# Patient Record
Sex: FEMALE | Race: AMERICAN INDIAN OR ALASKA NATIVE | NOT HISPANIC OR LATINO | Employment: OTHER | URBAN - METROPOLITAN AREA
[De-identification: names, ages, dates, MRNs, and addresses within clinical notes are randomized per-mention and may not be internally consistent; named-entity substitution may affect disease eponyms.]

---

## 2018-03-22 ENCOUNTER — HISTORICAL (OUTPATIENT)
Dept: RADIOLOGY | Facility: HOSPITAL | Age: 53
End: 2018-03-22

## 2018-03-22 LAB — H PYLORI AB SER IA-ACNC: NEGATIVE

## 2018-04-13 ENCOUNTER — HISTORICAL (OUTPATIENT)
Dept: SURGERY | Facility: HOSPITAL | Age: 53
End: 2018-04-13

## 2018-05-25 ENCOUNTER — HISTORICAL (OUTPATIENT)
Dept: RADIOLOGY | Facility: HOSPITAL | Age: 53
End: 2018-05-25

## 2018-06-29 ENCOUNTER — HISTORICAL (OUTPATIENT)
Dept: ADMINISTRATIVE | Facility: HOSPITAL | Age: 53
End: 2018-06-29

## 2018-06-29 LAB
ABS NEUT (OLG): 3.43 X10(3)/MCL (ref 2.1–9.2)
ALBUMIN SERPL-MCNC: 4 GM/DL (ref 3.4–5)
ALBUMIN/GLOB SERPL: 1 {RATIO}
ALP SERPL-CCNC: 107 UNIT/L (ref 45–117)
ALT SERPL-CCNC: 67 UNIT/L (ref 13–56)
AST SERPL-CCNC: 23 UNIT/L (ref 15–37)
BASOPHILS # BLD AUTO: 0.03 X10(3)/MCL (ref 0–0.2)
BASOPHILS NFR BLD AUTO: 0.5 % (ref 0–1)
BILIRUB SERPL-MCNC: 0.3 MG/DL (ref 0.2–1)
BILIRUBIN DIRECT+TOT PNL SERPL-MCNC: <0.1 MG/DL (ref 0–0.2)
BILIRUBIN DIRECT+TOT PNL SERPL-MCNC: >0.2 MG/DL (ref 0–1)
BUN SERPL-MCNC: 10 MG/DL (ref 7–18)
CALCIUM SERPL-MCNC: 9.3 MG/DL (ref 8.5–10.1)
CHLORIDE SERPL-SCNC: 104 MMOL/L (ref 98–107)
CO2 SERPL-SCNC: 26 MMOL/L (ref 21–32)
CREAT SERPL-MCNC: 0.76 MG/DL (ref 0.55–1.02)
CRP SERPL HS-MCNC: 7.64 MG/L
EOSINOPHIL # BLD AUTO: 0.2 X10(3)/MCL (ref 0–0.9)
EOSINOPHIL NFR BLD AUTO: 3.6 % (ref 0–6.4)
ERYTHROCYTE [DISTWIDTH] IN BLOOD BY AUTOMATED COUNT: 13 % (ref 11.5–17)
GLOBULIN SER-MCNC: 4 GM/DL (ref 2–4)
GLUCOSE SERPL-MCNC: 130 MG/DL (ref 74–106)
HCT VFR BLD AUTO: 40.4 % (ref 37–47)
HGB BLD-MCNC: 13.4 GM/DL (ref 12–16)
IMM GRANULOCYTES # BLD AUTO: 0.05 10*3/UL (ref 0–0.02)
IMM GRANULOCYTES NFR BLD AUTO: 0.9 % (ref 0–0.43)
LIPASE SERPL-CCNC: 158 UNIT/L (ref 73–393)
LYMPHOCYTES # BLD AUTO: 1.63 X10(3)/MCL (ref 0.6–4.6)
LYMPHOCYTES NFR BLD AUTO: 29.1 % (ref 16–44)
MCH RBC QN AUTO: 29.9 PG (ref 27–31)
MCHC RBC AUTO-ENTMCNC: 33.2 GM/DL (ref 33–36)
MCV RBC AUTO: 90.2 FL (ref 80–94)
MONOCYTES # BLD AUTO: 0.26 X10(3)/MCL (ref 0.1–1.3)
MONOCYTES NFR BLD AUTO: 4.6 % (ref 4–12.1)
NEUTROPHILS # BLD AUTO: 3.43 X10(3)/MCL (ref 2.1–9.2)
NEUTROPHILS NFR BLD AUTO: 61.3 % (ref 43–73)
NRBC BLD AUTO-RTO: 0 % (ref 0–0.2)
PLATELET # BLD AUTO: 357 X10(3)/MCL (ref 130–400)
PMV BLD AUTO: 9.3 FL (ref 7.4–10.4)
POC CREATININE: 0.7 MG/DL (ref 0.6–1.3)
POTASSIUM SERPL-SCNC: 4.2 MMOL/L (ref 3.5–5.1)
PROT SERPL-MCNC: 8.3 GM/DL (ref 6.4–8.2)
RBC # BLD AUTO: 4.48 X10(6)/MCL (ref 4.2–5.4)
SODIUM SERPL-SCNC: 139 MMOL/L (ref 136–145)
TSH SERPL-ACNC: 1.03 MIU/ML (ref 0.36–3.74)
WBC # SPEC AUTO: 5.6 X10(3)/MCL (ref 4.5–11.5)

## 2018-07-02 ENCOUNTER — HISTORICAL (OUTPATIENT)
Dept: LAB | Facility: HOSPITAL | Age: 53
End: 2018-07-02

## 2018-07-02 LAB — HEMOCCULT SP1 STL QL: NEGATIVE

## 2019-05-23 ENCOUNTER — HISTORICAL (OUTPATIENT)
Dept: ANESTHESIOLOGY | Facility: HOSPITAL | Age: 54
End: 2019-05-23

## 2019-05-28 ENCOUNTER — HISTORICAL (OUTPATIENT)
Dept: ANESTHESIOLOGY | Facility: HOSPITAL | Age: 54
End: 2019-05-28

## 2019-07-29 ENCOUNTER — HISTORICAL (OUTPATIENT)
Dept: PREADMISSION TESTING | Facility: HOSPITAL | Age: 54
End: 2019-07-29

## 2019-07-29 LAB
ABS NEUT (OLG): 4.36 X10(3)/MCL (ref 2.1–9.2)
ALBUMIN SERPL-MCNC: 4.4 GM/DL (ref 3.4–5)
ALBUMIN/GLOB SERPL: 1.3 RATIO (ref 1.1–2)
ALP SERPL-CCNC: 89 UNIT/L (ref 38–126)
ALT SERPL-CCNC: 71 UNIT/L (ref 12–78)
AST SERPL-CCNC: 28 UNIT/L (ref 15–37)
BASOPHILS # BLD AUTO: 0 X10(3)/MCL (ref 0–0.2)
BASOPHILS NFR BLD AUTO: 1 %
BILIRUB SERPL-MCNC: 0.5 MG/DL (ref 0.2–1)
BILIRUBIN DIRECT+TOT PNL SERPL-MCNC: 0.1 MG/DL (ref 0–0.5)
BILIRUBIN DIRECT+TOT PNL SERPL-MCNC: 0.4 MG/DL (ref 0–0.8)
BUN SERPL-MCNC: 7 MG/DL (ref 7–18)
CALCIUM SERPL-MCNC: 10 MG/DL (ref 8.5–10.1)
CHLORIDE SERPL-SCNC: 103 MMOL/L (ref 98–107)
CO2 SERPL-SCNC: 27 MMOL/L (ref 21–32)
CREAT SERPL-MCNC: 0.73 MG/DL (ref 0.55–1.02)
EOSINOPHIL # BLD AUTO: 0.4 X10(3)/MCL (ref 0–0.9)
EOSINOPHIL NFR BLD AUTO: 6 %
ERYTHROCYTE [DISTWIDTH] IN BLOOD BY AUTOMATED COUNT: 12.7 % (ref 11.5–17)
GLOBULIN SER-MCNC: 3.4 GM/DL (ref 2.4–3.5)
GLUCOSE SERPL-MCNC: 108 MG/DL (ref 74–106)
HCT VFR BLD AUTO: 43.7 % (ref 37–47)
HGB BLD-MCNC: 14.2 GM/DL (ref 12–16)
LYMPHOCYTES # BLD AUTO: 1.5 X10(3)/MCL (ref 0.6–4.6)
LYMPHOCYTES NFR BLD AUTO: 22 %
MCH RBC QN AUTO: 29.2 PG (ref 27–31)
MCHC RBC AUTO-ENTMCNC: 32.5 GM/DL (ref 33–36)
MCV RBC AUTO: 89.9 FL (ref 80–94)
MONOCYTES # BLD AUTO: 0.4 X10(3)/MCL (ref 0.1–1.3)
MONOCYTES NFR BLD AUTO: 6 %
NEUTROPHILS # BLD AUTO: 4.36 X10(3)/MCL (ref 2.1–9.2)
NEUTROPHILS NFR BLD AUTO: 64 %
PLATELET # BLD AUTO: 358 X10(3)/MCL (ref 130–400)
PMV BLD AUTO: 9.5 FL (ref 9.4–12.4)
POTASSIUM SERPL-SCNC: 4.4 MMOL/L (ref 3.5–5.1)
PROT SERPL-MCNC: 7.8 GM/DL (ref 6.4–8.2)
RBC # BLD AUTO: 4.86 X10(6)/MCL (ref 4.2–5.4)
SODIUM SERPL-SCNC: 139 MMOL/L (ref 136–145)
WBC # SPEC AUTO: 6.8 X10(3)/MCL (ref 4.5–11.5)

## 2019-07-31 ENCOUNTER — HISTORICAL (OUTPATIENT)
Dept: SURGERY | Facility: HOSPITAL | Age: 54
End: 2019-07-31

## 2021-10-22 ENCOUNTER — HISTORICAL (OUTPATIENT)
Dept: ADMINISTRATIVE | Facility: HOSPITAL | Age: 56
End: 2021-10-22

## 2021-11-05 ENCOUNTER — HISTORICAL (OUTPATIENT)
Dept: BARIATRICS | Facility: HOSPITAL | Age: 56
End: 2021-11-05

## 2021-11-09 ENCOUNTER — HISTORICAL (OUTPATIENT)
Dept: RADIOLOGY | Facility: HOSPITAL | Age: 56
End: 2021-11-09

## 2021-11-12 ENCOUNTER — HISTORICAL (OUTPATIENT)
Dept: ADMINISTRATIVE | Facility: HOSPITAL | Age: 56
End: 2021-11-12

## 2021-12-02 ENCOUNTER — HISTORICAL (OUTPATIENT)
Dept: BARIATRICS | Facility: HOSPITAL | Age: 56
End: 2021-12-02

## 2022-01-06 ENCOUNTER — HISTORICAL (OUTPATIENT)
Dept: BARIATRICS | Facility: HOSPITAL | Age: 57
End: 2022-01-06

## 2022-02-03 ENCOUNTER — HISTORICAL (OUTPATIENT)
Dept: BARIATRICS | Facility: HOSPITAL | Age: 57
End: 2022-02-03

## 2022-04-11 ENCOUNTER — HISTORICAL (OUTPATIENT)
Dept: ADMINISTRATIVE | Facility: HOSPITAL | Age: 57
End: 2022-04-11
Payer: MEDICARE

## 2022-04-26 ENCOUNTER — HISTORICAL (OUTPATIENT)
Dept: BARIATRICS | Facility: HOSPITAL | Age: 57
End: 2022-04-26
Payer: MEDICARE

## 2022-04-29 VITALS
OXYGEN SATURATION: 97 % | DIASTOLIC BLOOD PRESSURE: 92 MMHG | WEIGHT: 222.88 LBS | HEIGHT: 67 IN | BODY MASS INDEX: 34.98 KG/M2 | SYSTOLIC BLOOD PRESSURE: 139 MMHG

## 2022-04-30 NOTE — OP NOTE
Patient:   Teagan Aldrich            MRN: [id number]            FIN: [id number]               Age:   54 years     Sex:  Female     :  1965   Associated Diagnoses:   Cholecystitis, chronic   Author:   Malcom Hathaway MD      Operative Note   Operative Information   Procedures Performed: Procedure Code   Cholecystectomy Laparoscopic (None) on 2019 at 54 Years.  Comments:  2019 12:43 ROSANNET - Jovany STEWARD, Carmela BOSCH  auto-populated from documented surgical case.     Indications: chronic cholecystitis.     Preoperative Diagnosis: Cholecystitis, chronic (RHL51-SW K81.1).     Postoperative Diagnosis: Cholecystitis, chronic (GOD10-VJ K81.1).     Surgeon: Malcom Hathaway MD.     Assistant: Delaney Santiago.     Anesthesia: Gen..     Speciman Removed: gallbladder.     Description of Procedure/Findings/    Complications:     The patient was taken to the operating room. Patient was placed under general anesthesia. Abdomen was prepped and draped in the usual sterile fashion. An appropriate timeout was performed. Optical tipped trocar was used to access the peritoneal cavity. Pneumoperitoneum was instituted. Abdominal exploration was negative. Gallbladder was noted and held in a cephalad direction. The infundibulum was noted and held in a lateral direction. The peritoneum overlying the infundibulum was dissected revealing the cystic duct gallbladder junction and the cystic artery. Critical view was obtained. The cystic duct and cystic artery were clipped and transected. The gallbladder was removed from the undersurface of the liver with sharp and electric dissection. Hemostasis was assured. The clips were in excellent position and there was no bleeding or leakage of bile. Gallbladder was completely removed from the liver hemostasis was assured. The gallbladder was removed from the abdomen. Once again hemostasis was assured the operative site was irrigated until clear. Trochars were removed and  pneumoperitoneum released the incisions were closed with Vicryl..     Esimated blood loss: loss less than  15  cc.     Complications: None.

## 2022-04-30 NOTE — H&P
Patient:   Teagan Aldrich            MRN: [id number]            FIN: [id number]               Age:   56 years     Sex:  Female     :  1965   Associated Diagnoses:   Chronic GERD   Author:   Malcom Hathaway MD      Basic Information     Presents for EGD      Health Status   Allergies:    Allergic Reactions (Selected)  Severity Not Documented  Bactrim- Hives.  Sulfamethoxazole- Hives.   Current medications:    No qualifying data available        Histories   Past Medical History:    Active  Anxiety ([id number])  Depression (730581596)  Hypothyroidism (42153387)  Resolved  Well adult (682682731):  Resolved.  Chest pain (36330134):  Resolved.  Comments:  10/3/2016 CDT 15:11 CDT - Carolina Hurt RN  throught the day, has to relax for it to go away, 5/10    10/3/2016 CDT 15:25 ROSANNET Carolina Delatorre RN  upper gastric area like before her last surgery  Migraine (74226008):  Resolved.   Social History        Social & Psychosocial Habits    Alcohol  2015 Risk Assessment: Denies Alcohol Use    2020  Use: Current    Frequency: 1-2 times per year    Employment/School  2015  Status: Unemployed    Highest education: High school    2016  Status: DISABLED, Unemployed    Highest education: High school    Exercise  2015 Risk Assessment: Does not exercise    2016  Duration (average number of minutes): 60    Times per week: 3-4 times/week    Self assessment: Fair condition    Exercise type: Swimming, Walking    Home/Environment  2015  Lives with: Spouse    Living situation: Home/Independent    Alcohol abuse in household: No    Substance abuse in household: No    Smoker in household: No    Injuries/Abuse/Neglect in household: No    Feels unsafe at home: No    Safe place to go: Yes    Agency(s)/Others notified: No    Family/Friends available to help: Yes    Concern for family members at home: No    Major illness in household: No    Financial concerns: No    Concerns over  TV/Computer/Game use: No    Other risks in environment: Pets/Animal exposure, Unlocked guns    01/21/2016  Lives with: Spouse    Living situation: Home/Independent    Home equipment: BLOOD PRESSURE MONITOR    Alcohol abuse in household: No    Substance abuse in household: No    Smoker in household: No    Feels unsafe at home: No    Safe place to go: Yes    Family/Friends available to help: Yes    Nutrition/Health  08/20/2015  Type of diet: Regular    Wants to lose weight: Yes    Sleeping concerns: Yes    Feels highly stressed: Yes    01/21/2016  Diet description: ALL ORGANIC NO GLUTEN FREE    Substance Use  08/20/2015 Risk Assessment: Denies Substance Abuse    10/24/2015  Use: Never    Tobacco  07/24/2015 Risk Assessment: Denies Tobacco Use    06/05/2020  Use: Former smoker, quit more    Patient Wants Consult For Cessation Counseling N/A    Abuse/Neglect  06/05/2020  SHX Any signs of abuse or neglect No  .        Physical Examination   General:  Alert and oriented.    HENT:  Normocephalic.    Neck:  Supple.    Respiratory:  Lungs are clear to auscultation.    Cardiovascular:  Normal rate.    Gastrointestinal:  Normal, Abdomen.    Genitourinary:  No costovertebral angle tenderness.    Musculoskeletal:  Normal range of motion.    Integumentary:  Warm.    Neurologic:  Alert.       Review / Management   Results review:     No qualifying data available.       Impression and Plan   Diagnosis     Chronic GERD (GBX40-RM K21.9).         EGD

## 2022-04-30 NOTE — OP NOTE
Patient:   Teagan Aldrich            MRN: [id number]            FIN: [id number]               Age:   52 years     Sex:  Female     :  1965   Associated Diagnoses:   HEARTBURN   Author:   Malcom Hathaway MD      Operative Note   Operative Information   Date/ Time:  2018 11:07:00.     Procedures Performed: Procedure Code   Esophagogastroduodenoscopy on 2018 at 52 Years.  Comments:  2018 10:12 - Mitchell STEWARD, Anahi  auto-populated from documented surgical case, Esophagogastroduodenoscopy.     Preoperative Diagnosis: HEARTBURN (JCG42-ZF R12).     Postoperative Diagnosis: HEARTBURN (XWX61-FF R12).     Surgeon: Malcom Hathaway MD.     Anesthesia: MAC.     Description of Procedure/Findings/    Complications: Patient was taken to the OR.  The patient's throat was aneshtetized.  MAC was utilized for anesthesia.   was easily passed.  Findings were as follows:  -Oropharynx: normal  -Airway: normal  -Esophagus: normal  -Stomach: normal, No HH recurrence, Nissen intact  -Duodenum: normal.     Esimated blood loss: No blood loss.     Complications: None.

## 2022-09-21 ENCOUNTER — CLINICAL SUPPORT (OUTPATIENT)
Dept: BARIATRICS | Facility: HOSPITAL | Age: 57
End: 2022-09-21

## 2022-09-21 VITALS — BODY MASS INDEX: 35.21 KG/M2 | WEIGHT: 221.69 LBS

## 2022-10-10 NOTE — OP NOTE
Patient:   Teagan Aldrich            MRN: [id number]            FIN: [id number]               Age:   56 years     Sex:  Female     :  1965   Associated Diagnoses:   HEARTBURN   Author:   Malcom Hathaway MD      Operative Note   Operative Information   Procedures Performed: Procedure Code   21546- EGD FLEX TRANSORAL DX W/BRUSHING/WASHING (None) on 2021 at 56 Years.  Comments:  2021 12:38 CST - Jovany STEWARD, Brianne CHAPMAN  auto-populated from documented surgical case, Esophagogastroduodenoscopy.     Preoperative Diagnosis: HEARTBURN (DVC50-PI R12).     Postoperative Diagnosis: HEARTBURN (YGM18-QK R12).     Surgeon: Malcom Hathaway MD.     Anesthesia: MAC.     Description of Procedure/Findings/    Complications: Patient was taken to the OR.  The patient's throat was aneshtetized.  MAC was utilized for anesthesia.   was easily passed.  Findings were as follows:  -Oropharynx: normal  -Airway: normal  -Esophagus: normal  -Stomach: normal mucosa , Niissen fundoplication intact, no evidenc of recurrent HH  -Duodenum: normal.     Esimated blood loss: No blood loss.     Complications: None.      None

## 2022-12-09 ENCOUNTER — HOSPITAL ENCOUNTER (EMERGENCY)
Facility: HOSPITAL | Age: 57
Discharge: HOME | End: 2022-12-09
Attending: INTERNAL MEDICINE
Payer: MEDICARE

## 2022-12-09 VITALS
SYSTOLIC BLOOD PRESSURE: 107 MMHG | TEMPERATURE: 98 F | OXYGEN SATURATION: 100 % | HEART RATE: 80 BPM | DIASTOLIC BLOOD PRESSURE: 65 MMHG | WEIGHT: 220 LBS | RESPIRATION RATE: 17 BRPM | BODY MASS INDEX: 34.94 KG/M2

## 2022-12-09 DIAGNOSIS — G43.711 INTRACTABLE CHRONIC MIGRAINE WITHOUT AURA AND WITH STATUS MIGRAINOSUS: Primary | ICD-10-CM

## 2022-12-09 PROCEDURE — 99284 EMERGENCY DEPT VISIT MOD MDM: CPT | Mod: 25

## 2022-12-09 PROCEDURE — 63600175 PHARM REV CODE 636 W HCPCS: Performed by: PHYSICIAN ASSISTANT

## 2022-12-09 PROCEDURE — 96372 THER/PROPH/DIAG INJ SC/IM: CPT | Performed by: PHYSICIAN ASSISTANT

## 2022-12-09 RX ORDER — SERTRALINE HYDROCHLORIDE 100 MG/1
150 TABLET, FILM COATED ORAL
COMMUNITY
Start: 2022-11-27

## 2022-12-09 RX ORDER — KETOROLAC TROMETHAMINE 30 MG/ML
30 INJECTION, SOLUTION INTRAMUSCULAR; INTRAVENOUS
Status: COMPLETED | OUTPATIENT
Start: 2022-12-09 | End: 2022-12-09

## 2022-12-09 RX ORDER — GLIPIZIDE 5 MG/1
2.5 TABLET ORAL 2 TIMES DAILY
COMMUNITY
Start: 2022-11-19

## 2022-12-09 RX ORDER — LEVOTHYROXINE SODIUM 50 UG/1
50 TABLET ORAL
COMMUNITY
Start: 2022-11-22

## 2022-12-09 RX ORDER — ISOSORBIDE DINITRATE 5 MG/1
5 TABLET ORAL 2 TIMES DAILY
COMMUNITY
Start: 2022-11-21

## 2022-12-09 RX ORDER — DIPHENHYDRAMINE HYDROCHLORIDE 50 MG/ML
12.5 INJECTION INTRAMUSCULAR; INTRAVENOUS
Status: DISCONTINUED | OUTPATIENT
Start: 2022-12-09 | End: 2022-12-09

## 2022-12-09 RX ORDER — DILTIAZEM HYDROCHLORIDE 120 MG/1
120 CAPSULE, COATED, EXTENDED RELEASE ORAL 3 TIMES DAILY
COMMUNITY
Start: 2022-11-04

## 2022-12-09 RX ORDER — DIPHENHYDRAMINE HYDROCHLORIDE 50 MG/ML
12.5 INJECTION INTRAMUSCULAR; INTRAVENOUS
Status: COMPLETED | OUTPATIENT
Start: 2022-12-09 | End: 2022-12-09

## 2022-12-09 RX ORDER — MELOXICAM 15 MG/1
15 TABLET ORAL
COMMUNITY
Start: 2022-11-02 | End: 2024-03-27

## 2022-12-09 RX ORDER — ALPRAZOLAM 0.5 MG/1
0.5 TABLET ORAL 2 TIMES DAILY PRN
COMMUNITY
Start: 2022-11-01

## 2022-12-09 RX ORDER — LANOLIN ALCOHOL/MO/W.PET/CERES
1 CREAM (GRAM) TOPICAL 2 TIMES DAILY
COMMUNITY
Start: 2022-11-03 | End: 2024-03-27

## 2022-12-09 RX ORDER — ASPIRIN 81 MG/1
81 TABLET ORAL
COMMUNITY

## 2022-12-09 RX ORDER — TOPIRAMATE 25 MG/1
25 TABLET ORAL 2 TIMES DAILY
COMMUNITY
Start: 2022-11-22 | End: 2024-03-28

## 2022-12-09 RX ORDER — ATORVASTATIN CALCIUM 20 MG/1
20 TABLET, FILM COATED ORAL
COMMUNITY
Start: 2022-09-24

## 2022-12-09 RX ORDER — BUPROPION HYDROCHLORIDE 100 MG/1
TABLET ORAL
COMMUNITY
End: 2024-03-28

## 2022-12-09 RX ORDER — METOCLOPRAMIDE HYDROCHLORIDE 5 MG/ML
10 INJECTION INTRAMUSCULAR; INTRAVENOUS
Status: COMPLETED | OUTPATIENT
Start: 2022-12-09 | End: 2022-12-09

## 2022-12-09 RX ORDER — METFORMIN HYDROCHLORIDE 1000 MG/1
1000 TABLET ORAL 2 TIMES DAILY
COMMUNITY
Start: 2022-10-07

## 2022-12-09 RX ORDER — SOTALOL HYDROCHLORIDE 160 MG/1
160 TABLET ORAL 2 TIMES DAILY
COMMUNITY
Start: 2022-10-10

## 2022-12-09 RX ORDER — BUTALBITAL, ACETAMINOPHEN AND CAFFEINE 50; 325; 40 MG/1; MG/1; MG/1
1 TABLET ORAL EVERY 6 HOURS PRN
Qty: 20 TABLET | Refills: 0 | Status: SHIPPED | OUTPATIENT
Start: 2022-12-09 | End: 2024-03-28

## 2022-12-09 RX ORDER — AMLODIPINE BESYLATE 2.5 MG/1
TABLET ORAL
COMMUNITY
End: 2024-03-28

## 2022-12-09 RX ADMIN — KETOROLAC TROMETHAMINE 30 MG: 30 INJECTION, SOLUTION INTRAMUSCULAR; INTRAVENOUS at 01:12

## 2022-12-09 RX ADMIN — DIPHENHYDRAMINE HYDROCHLORIDE 12.5 MG: 50 INJECTION INTRAMUSCULAR; INTRAVENOUS at 01:12

## 2022-12-09 RX ADMIN — METOCLOPRAMIDE 10 MG: 5 INJECTION, SOLUTION INTRAMUSCULAR; INTRAVENOUS at 01:12

## 2022-12-09 NOTE — ED PROVIDER NOTES
Encounter Date: 12/9/2022       History     Chief Complaint   Patient presents with    Headache     X3 weeks, hx of migraine, +photosensitivity, placed on tramadol 2 weeks ago with no relief     Teagan Aldrich is a 57 y.o. female with a history of chronic migraines who presents to the ED with an intractable migraine. She reports this migraine has been present for 3 weeks and she could no longer take it  so presented here today. She saw her neurologist on 11/22 and was started on topomax increase weekly. Patient reports she has been compliant with topomax, but has had no relief. She reports photosensitivity. She has some relief with cold compresses. Endorses nausea this am that has now resolved.    The history is provided by the patient. No  was used.   Review of patient's allergies indicates:   Allergen Reactions    Bactrim [sulfamethoxazole-trimethoprim]      No past medical history on file.  No past surgical history on file.  No family history on file.     Review of Systems   Constitutional:  Negative for fever.   HENT:  Negative for congestion and sore throat.    Eyes:  Positive for photophobia. Negative for visual disturbance.   Respiratory:  Negative for cough and shortness of breath.    Cardiovascular:  Negative for chest pain and leg swelling.   Gastrointestinal:  Negative for abdominal distention, abdominal pain and nausea.   Genitourinary:  Negative for dysuria and frequency.   Musculoskeletal:  Negative for arthralgias and back pain.   Skin:  Negative for color change and rash.   Neurological:  Positive for headaches. Negative for weakness.   Hematological:  Does not bruise/bleed easily.   Psychiatric/Behavioral:  Negative for agitation and confusion.      Physical Exam     Initial Vitals [12/09/22 1243]   BP Pulse Resp Temp SpO2   104/68 80 18 97.4 °F (36.3 °C) 100 %      MAP       --         Physical Exam    Nursing note and vitals reviewed.  Constitutional: She appears  well-developed and well-nourished. No distress.   HENT:   Head: Normocephalic and atraumatic.   Mouth/Throat: No oropharyngeal exudate.   Eyes: EOM are normal. No scleral icterus.   Neck: Neck supple. No thyromegaly present.   Normal range of motion.  Cardiovascular:  Normal rate and regular rhythm.           No murmur heard.  Pulmonary/Chest: Breath sounds normal. No respiratory distress. She has no wheezes.   Abdominal: Abdomen is soft. Bowel sounds are normal. She exhibits no distension. There is no abdominal tenderness.   Musculoskeletal:         General: No edema. Normal range of motion.      Cervical back: Normal range of motion and neck supple.     Neurological: She is alert and oriented to person, place, and time. No cranial nerve deficit.   Skin: Skin is warm and dry. Capillary refill takes less than 2 seconds. No erythema.   Psychiatric: She has a normal mood and affect. Thought content normal.       ED Course   Procedures  Labs Reviewed - No data to display       Imaging Results    None          Medications   ketorolac injection 30 mg (30 mg Intramuscular Given 12/9/22 1357)   metoclopramide HCl injection 10 mg (10 mg Intramuscular Given 12/9/22 1357)   diphenhydrAMINE injection 12.5 mg (12.5 mg Intramuscular Given 12/9/22 1357)                 ED Course as of 12/09/22 1501   Fri Dec 09, 2022   1451 Patient reports improvement in pain to 2/10. Requesting discharge.  [KD]      ED Course User Index  [KD] Tana Quinones PA-C                 Clinical Impression:   Final diagnoses:  [G43.711] Intractable chronic migraine without aura and with status migrainosus (Primary)      ED Disposition Condition    Discharge Stable          ED Prescriptions       Medication Sig Dispense Start Date End Date Auth. Provider    butalbital-acetaminophen-caffeine -40 mg (FIORICET, ESGIC) -40 mg per tablet Take 1 tablet by mouth every 6 (six) hours as needed for Headaches. 20 tablet 12/9/2022 -- Tana BLAKE  JERZY Quinones          Follow-up Information       Follow up With Specialties Details Why Contact Info    Teagan Galdamez MD Family Medicine In 3 days Hospital follow up [address] [facility]  [address] [address]  [phone]      [facility] Emergency Medicine  If symptoms worsen [address] [facility] [id number] [phone]             Tana Quinones PA-C  12/09/22 1500

## 2022-12-15 ENCOUNTER — PATIENT MESSAGE (OUTPATIENT)
Dept: RESEARCH | Facility: HOSPITAL | Age: 57
End: 2022-12-15
Payer: MEDICARE

## 2023-02-27 DIAGNOSIS — K57.30 DIVERTICULOSIS OF LARGE INTESTINE WITHOUT DIVERTICULITIS: Primary | ICD-10-CM

## 2023-02-27 DIAGNOSIS — Z98.890 PERSONAL HISTORY OF SURGERY TO HEART AND GREAT VESSELS, PRESENTING HAZARDS TO HEALTH: Primary | ICD-10-CM

## 2023-03-02 ENCOUNTER — HOSPITAL ENCOUNTER (OUTPATIENT)
Dept: RADIOLOGY | Facility: HOSPITAL | Age: 58
Discharge: HOME | End: 2023-03-02
Payer: MEDICARE

## 2023-03-02 DIAGNOSIS — K57.30 DIVERTICULOSIS OF LARGE INTESTINE WITHOUT DIVERTICULITIS: ICD-10-CM

## 2023-03-02 PROCEDURE — A9698 NON-RAD CONTRAST MATERIALNOC: HCPCS

## 2023-03-02 PROCEDURE — 74240 X-RAY XM UPR GI TRC 1CNTRST: CPT | Mod: TC

## 2023-03-02 PROCEDURE — 25500020 PHARM REV CODE 255

## 2023-03-02 RX ADMIN — BARIUM SULFATE 100 ML: 980 POWDER, FOR SUSPENSION ORAL at 09:03

## 2023-03-02 RX ADMIN — BARIUM SULFATE 100 ML: 0.6 SUSPENSION ORAL at 09:03

## 2023-07-20 ENCOUNTER — CLINICAL SUPPORT (OUTPATIENT)
Dept: BARIATRICS | Facility: HOSPITAL | Age: 58
End: 2023-07-20
Payer: MEDICARE

## 2023-07-20 VITALS — WEIGHT: 239.5 LBS | HEIGHT: 67 IN | BODY MASS INDEX: 37.59 KG/M2

## 2023-07-20 PROCEDURE — 94200034 HC BARIATRIC NUTRITIONAL SVCS PT GRADE I: Performed by: DIETITIAN, REGISTERED

## 2023-07-20 NOTE — PROGRESS NOTES
Patient Education [x] MSWL Visit Number: 1/3     []  Pre-op Wt Loss Goal (as ordered on referral):   [] NSWL Visit:     Current Wt: 239#  Current BMI: 38 kg/m2                                                                      Barriers to learning:  [x]None evident  []Acuity of illness  []Cognitive defects  []Cultural barriers  []Desire/Motivation  []Difficulty concentrating  []Emotional state  []Financial concerns  []Hearing deficit  []Language barrier  []Literacy  []Memory problems  []Vision impairment     Home caregiver present for session   []YES  [x] NO       Teaching methods:  []Demonstration  [x]Explanation  []Printed materials  []Teach back  []Virtual/web based         Verbalizes understanding Demonstrates  Needs further teaching Needs practice/ supervision Comments    Bariatric Surgery Diet  [] [] [] []    Clear liquid [] [] [] []    Full liquid [] [] [] []    Weight Reduction [x] [] [] []    Other Diet [] [] [] []          Additional Learner (s) Present                                                                  []Spouse   []Daughter    []  Son  []Family member   []Friend   []Grandfather   []Grandmother   []Father   []Mother   []Other       Expected Compliance:  [x]Good  []Fair  []Poor    Additional Information:    Pt restarting MSWL visits for upcoming bariatric surgery. Last visit was a weight check in 9/2022. Pt having knee replacement next week.    24 hr recall:  B- skipped  L- sandwich with chips  S- small snack    Plan:  Resume 3 meals per day.  Incorporate a protein and either a fruit or vegetable at each meal.  Adequate water intake.

## 2023-08-22 ENCOUNTER — CLINICAL SUPPORT (OUTPATIENT)
Dept: BARIATRICS | Facility: HOSPITAL | Age: 58
End: 2023-08-22
Payer: MEDICARE

## 2023-08-22 VITALS — WEIGHT: 237 LBS | HEIGHT: 67 IN | BODY MASS INDEX: 37.2 KG/M2

## 2023-08-22 DIAGNOSIS — E66.9 OBESITY (BMI 30-39.9): Primary | ICD-10-CM

## 2023-08-22 PROCEDURE — 94200034 HC BARIATRIC NUTRITIONAL SVCS PT GRADE I: Performed by: DIETITIAN, REGISTERED

## 2023-08-22 NOTE — PROGRESS NOTES
Patient Education [x] MSWL Visit Number: 2/3     []  Pre-op Wt Loss Goal (as ordered on referral):   [] NS Visit:     Current Wt: 237#  Current BMI: 37.6 kg/m2                                                                      Barriers to learning:  [x]None evident  []Acuity of illness  []Cognitive defects  []Cultural barriers  []Desire/Motivation  []Difficulty concentrating  []Emotional state  []Financial concerns  []Hearing deficit  []Language barrier  []Literacy  []Memory problems  []Vision impairment     Home caregiver present for session   []YES  [x] NO       Teaching methods:  []Demonstration  [x]Explanation  []Printed materials  []Teach back  []Virtual/web based         Verbalizes understanding Demonstrates  Needs further teaching Needs practice/ supervision Comments    Bariatric Surgery Diet  [] [] [] []    Clear liquid [] [] [] []    Full liquid [] [] [] []    Weight Reduction [x] [] [] []    Other Diet [] [] [] []          Additional Learner (s) Present                                                                  []Spouse   []Daughter    []  Son  []Family member   []Friend   []Grandfather   []Grandmother   []Father   []Mother   []Other       Expected Compliance:  [x]Good  []Fair  []Poor    Additional Information:    Pt  with 2# wt loss since last visit. She had knee replacement surgery approx 4 weeks ago and is still dealing with some pain and is transitioning from home PT to clinic based PT soon. Pt reports less appetite due to the pain she is experiencing. Pt reports 2 main meals per day with snacks. Pt states she is drinking only calorie free beverages- water, Powerade Zero.    Plan:  3 meals per day with a protein and either a fruit or vegetable at each meal. Starch serving should be the smallest of 3 on plate.  Continue clinic based PT as ordered and increase movement.

## 2023-09-14 ENCOUNTER — TELEPHONE (OUTPATIENT)
Dept: SURGERY | Facility: CLINIC | Age: 58
End: 2023-09-14
Payer: MEDICARE

## 2023-09-15 NOTE — TELEPHONE ENCOUNTER
Isis Logan, ABDOULAYE Becker      Will defer med recommendations to MA/ NP.   No surgery dates available yet for revisions.  yet for revisions.

## 2023-09-15 NOTE — TELEPHONE ENCOUNTER
Delaney - no revisions available at this time. However pt is still questioning medications. Per Cassandra message -  Pt called stating she has been having GERD issues, she saw an ENT recently and was prescribed esomeprazole and famotidime and wants to make sure this is ok to take right now before having her procedure

## 2023-09-19 ENCOUNTER — CLINICAL SUPPORT (OUTPATIENT)
Dept: BARIATRICS | Facility: HOSPITAL | Age: 58
End: 2023-09-19
Payer: MEDICARE

## 2023-09-19 VITALS — WEIGHT: 234 LBS | BODY MASS INDEX: 36.73 KG/M2 | HEIGHT: 67 IN

## 2023-09-19 DIAGNOSIS — E66.9 OBESITY (BMI 30-39.9): Primary | ICD-10-CM

## 2023-09-19 PROCEDURE — 94200034 HC BARIATRIC NUTRITIONAL SVCS PT GRADE I: Performed by: DIETITIAN, REGISTERED

## 2023-10-10 ENCOUNTER — OFFICE VISIT (OUTPATIENT)
Dept: SURGERY | Facility: CLINIC | Age: 58
End: 2023-10-10
Payer: MEDICARE

## 2023-10-10 VITALS
BODY MASS INDEX: 35.66 KG/M2 | DIASTOLIC BLOOD PRESSURE: 84 MMHG | SYSTOLIC BLOOD PRESSURE: 128 MMHG | HEIGHT: 67 IN | WEIGHT: 227.19 LBS | HEART RATE: 103 BPM

## 2023-10-10 DIAGNOSIS — K21.9 GASTROESOPHAGEAL REFLUX DISEASE, UNSPECIFIED WHETHER ESOPHAGITIS PRESENT: Primary | ICD-10-CM

## 2023-10-10 PROCEDURE — 99214 OFFICE O/P EST MOD 30 MIN: CPT | Mod: ,,, | Performed by: SURGERY

## 2023-10-10 PROCEDURE — 99214 PR OFFICE/OUTPT VISIT, EST, LEVL IV, 30-39 MIN: ICD-10-PCS | Mod: ,,, | Performed by: SURGERY

## 2023-10-10 RX ORDER — FAMOTIDINE 20 MG/1
20 TABLET, FILM COATED ORAL 2 TIMES DAILY
COMMUNITY
End: 2024-03-28

## 2023-10-10 RX ORDER — MAGNESIUM 200 MG
400 TABLET ORAL
COMMUNITY

## 2023-10-10 NOTE — PROGRESS NOTES
"    [facility]  [address] [place] [address]  Phone: [phone]  Fax: [phone]    [facility]  FU GERD   Dr. Malcom Hathaway      Patient: Teagan Aldrich  Date: 10/10/2023   Author: Malcom Hathaway MD    Chief Complaint:   Chief Complaint   Patient presents with    Consult     Discuss revisional RNY     Weight Gain       History of Present Illness:  Ms. Teagan Aldrich is a 58 y.o. female who is several years s/p Lap Nissen  by Dr. Malcom Hathaway.    .     complaints.  dysphagia,  GERD, NV, no abd pain. Regular BMs.   Diet: compliant with bariatric meal plan, tolerating bariatric soft diet          Estimated body mass index is 35.58 kg/m² as calculated from the following:    Height as of this encounter: 5' 7" (1.702 m).    Weight as of this encounter: 103.1 kg (227 lb 3.2 oz).                   Patient Care Team:  Teagan Galdamez MD as PCP - General (Family Medicine)     Review of Systems:    12 point review of systems conducted, negative except as stated in the history of present illness. See HPI for details.    Review of patient's allergies indicates:   Allergen Reactions    Bactrim [sulfamethoxazole-trimethoprim]      Past Medical History:   Diagnosis Date    Abdominal pain     Anal polyp     Anxiety     Depression     Diabetes mellitus     Disorder of lumbar spine     Diverticul disease small and large intestine, no perforati or abscess     GERD (gastroesophageal reflux disease)     Heart murmur     Hyperlipidemia     Hypertension     Irregular heart rhythm     Migraine     Morbid obesity     Panic attack     SVT (supraventricular tachycardia)     Thyroid disease      Past Surgical History:   Procedure Laterality Date    BACK SURGERY      06/2022    CARPAL TUNNEL RELEASE      CHOLECYSTECTOMY      COLONOSCOPY      ESOPHAGOGASTRODUODENOSCOPY      hemorroidectomy      JOINT REPLACEMENT      " KNEE SURGERY      06/25/2023    LAMINECTOMY      NECK SURGERY      12/2019    NISSEN FUNDOPLICATION  2007    ROTATOR CUFF REPAIR      TONSILLECTOMY      TUBAL LIGATION       Family History   Problem Relation Age of Onset    Diabetes Mother     Depression Mother     Hypertension Mother     Hyperlipidemia Mother     Hypertension Father     Hyperlipidemia Father     Heart disease Father     Diabetes Father     Depression Father      Social History     Tobacco Use    Smoking status: Former     Types: Cigarettes    Smokeless tobacco: Former   Substance Use Topics    Alcohol use: Not Currently      Current Outpatient Medications   Medication Instructions    ALPRAZolam (XANAX) 0.5 mg, Oral, 2 times daily PRN    amLODIPine (NORVASC) 2.5 MG tablet amlodipine Take No date recorded No form recorded No frequency recorded No route recorded No set duration recorded No set duration amount recorded active No dosage strength recorded No dosage strength units of measure recorded    aspirin (ECOTRIN) 81 mg, Oral    atorvastatin (LIPITOR) 20 mg, Oral    buPROPion (WELLBUTRIN) 100 MG tablet bupropion HCl Take No date recorded No form recorded No frequency recorded No route recorded No set duration recorded No set duration amount recorded active No dosage strength recorded No dosage strength units of measure recorded    butalbital-acetaminophen-caffeine -40 mg (FIORICET, ESGIC) -40 mg per tablet 1 tablet, Oral, Every 6 hours PRN    DIINDOLYLMETHANE, BULK, MISC Misc.(Non-Drug; Combo Route)    diltiaZEM (CARDIZEM CD) 120 mg, Oral, 3 times daily    famotidine (PEPCID) 20 mg, Oral, 2 times daily    glipiZIDE (GLUCOTROL) 2.5 mg, Oral, 2 times daily    isosorbide dinitrate (ISORDIL) 5 mg, Oral, 2 times daily    levothyroxine (SYNTHROID) 50 mcg, Oral    magnesium 200 mg Tab magnesium Take No date recorded No form recorded No frequency recorded No route recorded No set duration recorded No set duration amount recorded active No  "dosage strength recorded No dosage strength units of measure recorded    magnesium oxide (MAG-OX) 400 mg (241.3 mg magnesium) tablet 1 tablet, Oral, 2 times daily    meloxicam (MOBIC) 15 mg, Oral    metFORMIN (GLUCOPHAGE) 1,000 mg, Oral, 2 times daily    sertraline (ZOLOFT) 100 mg, Oral    sotaloL (BETAPACE) 160 mg, Oral, 2 times daily    topiramate (TOPAMAX) 25 mg, Oral, 2 times daily       Objective:     Vital Signs (Most Recent):  Visit Vitals  /84   Pulse 103   Ht 5' 7" (1.702 m)   Wt 103.1 kg (227 lb 3.2 oz)   BMI 35.58 kg/m²       Physical Exam:  General: Alert and oriented, No acute distress.  Respiratory: Clear to auscultation bilaterally; No wheezes, rales or rhonchi,Non-labored respirations  Cardiovascular: Regular rate and rhythm   Gastrointestinal: Abd soft, Non-tender, Non-distended, Bowel sounds present, Lap sites clean dry and intact, no evidence of infection.   Musculoskeletal: Normal range of motion.  Integumentary: Warm, Dry, Intact  Neurologic: No focal deficits  Psychiatric: Appropriate affect      Assessment and Plan:     GERD after Nissen  Imaging   ADALBERTO Hathaway MD       "

## 2023-10-12 LAB — UREA BREATH TEST QL: NEGATIVE

## 2024-03-19 ENCOUNTER — TELEPHONE (OUTPATIENT)
Dept: SURGERY | Facility: CLINIC | Age: 59
End: 2024-03-19
Payer: MEDICARE

## 2024-03-20 DIAGNOSIS — K21.9 GASTROESOPHAGEAL REFLUX DISEASE, UNSPECIFIED WHETHER ESOPHAGITIS PRESENT: Primary | ICD-10-CM

## 2024-03-21 ENCOUNTER — CLINICAL SUPPORT (OUTPATIENT)
Dept: BARIATRICS | Facility: HOSPITAL | Age: 59
End: 2024-03-21

## 2024-03-21 DIAGNOSIS — E66.9 OBESITY: Primary | ICD-10-CM

## 2024-03-21 DIAGNOSIS — E66.9 OBESITY, UNSPECIFIED CLASSIFICATION, UNSPECIFIED OBESITY TYPE, UNSPECIFIED WHETHER SERIOUS COMORBIDITY PRESENT: Primary | ICD-10-CM

## 2024-03-21 DIAGNOSIS — Z98.84 HISTORY OF BARIATRIC SURGERY: Primary | ICD-10-CM

## 2024-03-21 NOTE — PROGRESS NOTES
Date of education: 3/21/24  Pt education type: [x]Pre op  []Post op  Surgery date: 4/22/24  Type of surgery: revisional procedure     Education was provided on:   [x]Importance of protein and vitamin protocol  [x]Importance of drinking  fl oz/day of non carbonated sugar free non caffeinated beverages  [x]Importance of following dietary protocol  [x]Importance of early ambulation postop   [x]Use of incentive spirometer 10 times an hour while awake  [x]Non opiod pain management post op   [x]Discontinuing use of meds containing aspirin, ibuprofen, NSAIDs, post op  [x]Signs and symptoms of immediate and long term complications post-op  [x]Prevention and signs and symptoms of blood clots   [x]Prevention and signs of infection  [x]Reviewed medication regimen  [x]Importance of adhering to behavioral changes  [x]Importance of following exercise protocol      Barriers to learning:  [x]None evident  []Acuity of illness  []Cognitive defects  []Cultural barriers  []Desire/Motivation  []Difficulty concentrating  []Emotional state  []Financial concerns  []Hearing deficit  []Language barrier  []Literacy  []Memory problems  []Vision impairment     Teaching methods:  []Demonstration  [x]Explanation  [x]Printed materials  [x]Teach back  []Virtual/web based    Expected Compliance:  [x]Good  []Fair  []Poor    Additional Notes:  Reviewed above protocols with patient. Reiterated multiple times about importance of dietary and behavioral protocols and importance of following the protocols to prevent complications.

## 2024-03-21 NOTE — PROGRESS NOTES
Date of education: 3/21/24  Pt education type: [x]Pre op  []Post op  Surgery date: 4/21/24  Type of surgery: revisional procedure     Education was provided on:   [x]Importance of protein and vitamin protocol  [x]Importance of drinking  fl oz/day of non carbonated sugar free non caffeinated beverages  [x]Importance of following dietary protocol  []Importance of early ambulation postop   []Use of incentive spirometer 10 times an hour while awake  []Non opiod pain management post op   []Discontinuing use of meds containing aspirin, ibuprofen, NSAIDs, post op  []Signs and symptoms of immediate and long term complications post-op  []Prevention and signs and symptoms of blood clots   []Prevention and signs of infection  []Reviewed medication regimen  []Importance of adhering to behavioral changes  []Importance of following exercise protocol      Barriers to learning:  [x]None evident  []Acuity of illness  []Cognitive defects  []Cultural barriers  []Desire/Motivation  []Difficulty concentrating  []Emotional state  []Financial concerns  []Hearing deficit  []Language barrier  []Literacy  []Memory problems  []Vision impairment     Teaching methods:  []Demonstration  [x]Explanation  [x]Printed materials  [x]Teach back  []Virtual/web based    Expected Compliance:  [x]Good  []Fair  []Poor    Additional Notes:

## 2024-03-21 NOTE — PROGRESS NOTES
Date of education: 3/21/24  Pt education type: [x]Pre op  []Post op  Surgery date:   Type of surgery: laparoscopic Gisela-en-Y     Education was provided on:   []Importance of protein and vitamin protocol  []Importance of drinking  fl oz/day of non carbonated sugar free non caffeinated beverages  []Importance of following dietary protocol  []Importance of early ambulation postop   []Use of incentive spirometer 10 times an hour while awake  []Non opiod pain management post op   []Discontinuing use of meds containing aspirin, ibuprofen, NSAIDs, post op  []Signs and symptoms of immediate and long term complications post-op  []Prevention and signs and symptoms of blood clots   []Prevention and signs of infection  []Reviewed medication regimen  [x]Importance of adhering to behavioral changes  [x]Importance of following exercise protocol      Barriers to learning:  [x]None evident  []Acuity of illness  []Cognitive defects  []Cultural barriers  []Desire/Motivation  []Difficulty concentrating  []Emotional state  []Financial concerns  []Hearing deficit  []Language barrier  []Literacy  []Memory problems  []Vision impairment     Teaching methods:  [x]Demonstration  [x]Explanation  [x]Printed materials  [x]Teach back  []Virtual/web based    Expected Compliance:  [x]Good  []Fair  []Poor    Additional Notes:  A body composition was conducted.    PRABHA Marino, CPT, CHC

## 2024-03-27 RX ORDER — ESTRADIOL 0.5 MG/1
0.5 TABLET ORAL DAILY
COMMUNITY

## 2024-03-27 RX ORDER — ESOMEPRAZOLE MAGNESIUM 40 MG/1
40 CAPSULE, DELAYED RELEASE ORAL
Status: ON HOLD | COMMUNITY
End: 2024-04-25

## 2024-03-27 RX ORDER — PROGESTERONE 100 MG/1
100 CAPSULE ORAL DAILY
COMMUNITY

## 2024-03-27 RX ORDER — SALIVA STIMULANT COMB. NO.4
SPRAY, NON-AEROSOL (ML) MUCOUS MEMBRANE
COMMUNITY

## 2024-03-27 RX ORDER — ACETAMINOPHEN 500 MG
TABLET ORAL DAILY
COMMUNITY

## 2024-03-27 RX ORDER — CHOLECALCIFEROL (VITAMIN D3) 125 MCG
CAPSULE ORAL
COMMUNITY

## 2024-03-27 RX ORDER — RIVAROXABAN 2.5 MG/1
2.5 TABLET, FILM COATED ORAL DAILY
Status: ON HOLD | COMMUNITY
End: 2024-04-25

## 2024-03-27 RX ORDER — ZINC GLUCONATE 50 MG
50 TABLET ORAL DAILY
COMMUNITY

## 2024-03-28 ENCOUNTER — LAB VISIT (OUTPATIENT)
Dept: LAB | Facility: HOSPITAL | Age: 59
End: 2024-03-28
Attending: SURGERY
Payer: MEDICARE

## 2024-03-28 ENCOUNTER — OFFICE VISIT (OUTPATIENT)
Dept: SURGERY | Facility: CLINIC | Age: 59
End: 2024-03-28
Payer: MEDICARE

## 2024-03-28 VITALS
WEIGHT: 235.81 LBS | HEIGHT: 67 IN | BODY MASS INDEX: 37.01 KG/M2 | SYSTOLIC BLOOD PRESSURE: 117 MMHG | HEART RATE: 99 BPM | DIASTOLIC BLOOD PRESSURE: 76 MMHG

## 2024-03-28 DIAGNOSIS — Z01.818 PREOP EXAMINATION: Primary | ICD-10-CM

## 2024-03-28 DIAGNOSIS — K21.00 GASTROESOPHAGEAL REFLUX DISEASE WITH ESOPHAGITIS WITHOUT HEMORRHAGE: ICD-10-CM

## 2024-03-28 DIAGNOSIS — Z01.818 PREOP EXAMINATION: ICD-10-CM

## 2024-03-28 LAB
ALBUMIN SERPL-MCNC: 4.1 G/DL (ref 3.5–5)
ALBUMIN/GLOB SERPL: 1.2 RATIO (ref 1.1–2)
ALP SERPL-CCNC: 116 UNIT/L (ref 40–150)
ALT SERPL-CCNC: 38 UNIT/L (ref 0–55)
APTT PPP: 34 SECONDS (ref 23.2–33.7)
AST SERPL-CCNC: 26 UNIT/L (ref 5–34)
BASOPHILS # BLD AUTO: 0.04 X10(3)/MCL
BASOPHILS NFR BLD AUTO: 0.5 %
BILIRUB SERPL-MCNC: 0.2 MG/DL
BUN SERPL-MCNC: 15.2 MG/DL (ref 9.8–20.1)
CALCIUM SERPL-MCNC: 10.3 MG/DL (ref 8.4–10.2)
CHLORIDE SERPL-SCNC: 102 MMOL/L (ref 98–107)
CO2 SERPL-SCNC: 25 MMOL/L (ref 22–29)
CREAT SERPL-MCNC: 0.8 MG/DL (ref 0.55–1.02)
EOSINOPHIL # BLD AUTO: 0.64 X10(3)/MCL (ref 0–0.9)
EOSINOPHIL NFR BLD AUTO: 8.5 %
ERYTHROCYTE [DISTWIDTH] IN BLOOD BY AUTOMATED COUNT: 12.9 % (ref 11.5–17)
GFR SERPLBLD CREATININE-BSD FMLA CKD-EPI: >60 MLS/MIN/1.73/M2
GLOBULIN SER-MCNC: 3.5 GM/DL (ref 2.4–3.5)
GLUCOSE SERPL-MCNC: 208 MG/DL (ref 74–100)
HCT VFR BLD AUTO: 44.5 % (ref 37–47)
HGB BLD-MCNC: 14.3 G/DL (ref 12–16)
IMM GRANULOCYTES # BLD AUTO: 0.04 X10(3)/MCL (ref 0–0.04)
IMM GRANULOCYTES NFR BLD AUTO: 0.5 %
LYMPHOCYTES # BLD AUTO: 1.32 X10(3)/MCL (ref 0.6–4.6)
LYMPHOCYTES NFR BLD AUTO: 17.5 %
MCH RBC QN AUTO: 29.5 PG (ref 27–31)
MCHC RBC AUTO-ENTMCNC: 32.1 G/DL (ref 33–36)
MCV RBC AUTO: 91.8 FL (ref 80–94)
MONOCYTES # BLD AUTO: 0.39 X10(3)/MCL (ref 0.1–1.3)
MONOCYTES NFR BLD AUTO: 5.2 %
NEUTROPHILS # BLD AUTO: 5.11 X10(3)/MCL (ref 2.1–9.2)
NEUTROPHILS NFR BLD AUTO: 67.8 %
NRBC BLD AUTO-RTO: 0 %
PLATELET # BLD AUTO: 360 X10(3)/MCL (ref 130–400)
PMV BLD AUTO: 9.2 FL (ref 7.4–10.4)
POTASSIUM SERPL-SCNC: 4.6 MMOL/L (ref 3.5–5.1)
PROT SERPL-MCNC: 7.6 GM/DL (ref 6.4–8.3)
RBC # BLD AUTO: 4.85 X10(6)/MCL (ref 4.2–5.4)
SODIUM SERPL-SCNC: 138 MMOL/L (ref 136–145)
WBC # SPEC AUTO: 7.54 X10(3)/MCL (ref 4.5–11.5)

## 2024-03-28 PROCEDURE — 36415 COLL VENOUS BLD VENIPUNCTURE: CPT

## 2024-03-28 PROCEDURE — 99214 OFFICE O/P EST MOD 30 MIN: CPT | Mod: ,,, | Performed by: SURGERY

## 2024-03-28 NOTE — PROGRESS NOTES
[facility] Surgical - General Surgery Services  [address] [place] [address]  Phone: [phone]  Fax: [phone]     HISTORY & PHYSICAL  General Surgery  Dr. Malcom Hathaway      Patient Name: Teagan Aldrich  YOB: 1965  Author: Malcom Hathaway MD     Date: 03/28/2024                   SUBJECTIVE:     Chief Complaint/Reason for Admission:   Chief Complaint   Patient presents with    Pre-op Exam     RNY 4/22/2024        History of Present Illness:  Ms. Teagan Aldrich is a 58 y.o. female who presents to clinic for surgical evaluation of intractable reflux.     She underwent Lap Nissen in 2007.  Recently began to have recurrent reflux.    BMI 37    Has completed all education regarding GJ    Positive symptoms:   Heartburn    Previous treatments:   PPI, H2 Antagonist, and OTC Antacids    Referring provider: No ref. provider found   Patient Care Team:  Teagan Galdamez MD as PCP - General (Family Medicine)  Malcom Hathaway MD as Surgeon (Bariatrics)     Pertinent workup:    X-Ray Knee 3 View Left  Narrative: EXAMINATION:  XR KNEE 3 VIEW LEFT    CLINICAL HISTORY:  Presence of left artificial knee joint    TECHNIQUE:  AP, lateral, and Merchant views of the left knee were performed.    COMPARISON:  None    FINDINGS:  Left knee total arthroplasty is present, without evidence of hardware failure or loosening.  Alignment is anatomic.  No knee joint effusion is appreciated.  There is no focal soft tissue swelling.    The frontal view of the right knee shows no significant abnormality.  Impression: Left knee arthroplasty.  No hardware failure or loosening appreciated.    Electronically signed by: Gilson Liang  Date:    12/21/2023  Time:    15:41      Review of Systems:  12 point ROS negative except as stated in HPI    PAST HISTORY:     Past Medical History:   Diagnosis Date    Abdominal pain     Anal polyp     Anxiety     Depression     Diabetes mellitus      Disorder of lumbar spine     Diverticul disease small and large intestine, no perforati or abscess     GERD (gastroesophageal reflux disease)     Heart murmur     Hyperlipidemia     Hypertension     Irregular heart rhythm     Migraine     Morbid obesity     Panic attack     SVT (supraventricular tachycardia)     Thyroid disease      Past Surgical History:   Procedure Laterality Date    BACK SURGERY      06/2022    CARPAL TUNNEL RELEASE      CHOLECYSTECTOMY      COLONOSCOPY      ESOPHAGOGASTRODUODENOSCOPY      hemorroidectomy      JOINT REPLACEMENT      KNEE SURGERY      06/25/2023    LAMINECTOMY      NECK SURGERY      12/2019    NISSEN FUNDOPLICATION  2007    ROTATOR CUFF REPAIR      TONSILLECTOMY      TUBAL LIGATION       Family History   Problem Relation Age of Onset    Diabetes Mother     Depression Mother     Hypertension Mother     Hyperlipidemia Mother     Hypertension Father     Hyperlipidemia Father     Heart disease Father     Diabetes Father     Depression Father      Social History     Socioeconomic History    Marital status:    Tobacco Use    Smoking status: Former     Types: Cigarettes    Smokeless tobacco: Former   Substance and Sexual Activity    Alcohol use: Not Currently       MEDICATIONS & ALLERGIES:     Current Outpatient Medications on File Prior to Visit   Medication Sig    ALPRAZolam (XANAX) 0.5 MG tablet Take 0.5 mg by mouth 2 (two) times daily as needed.    aspirin (ECOTRIN) 81 MG EC tablet Take 81 mg by mouth. Stop 5-7 days pre op and follow doctors instructions post op    atorvastatin (LIPITOR) 20 MG tablet Take 20 mg by mouth.    biotin 5,000 mcg TbDL Take by mouth. Hold postop    cholecalciferol, vitamin D3, (VITAMIN D3) 50 mcg (2,000 unit) Cap capsule Take by mouth once daily.    cranberry extract 500 mg Cap Take by mouth. Stop 2 wks pre op and 4 wks postop    diltiaZEM (CARDIZEM CD) 120 MG Cp24 Take 120 mg by mouth 3 (three) times daily.    esomeprazole (NEXIUM) 40 MG capsule  Take 40 mg by mouth before breakfast.    glipiZIDE (GLUCOTROL) 5 MG tablet Take 2.5 mg by mouth 2 (two) times daily. Ask orescribing doctor about pre op dose before starting pre op diet ask about post op    isosorbide dinitrate (ISORDIL) 5 MG Tab Take 5 mg by mouth 2 (two) times daily.    levothyroxine (SYNTHROID) 50 MCG tablet Take 50 mcg by mouth.    magnesium 200 mg Tab 400 mg.    metFORMIN (GLUCOPHAGE) 1000 MG tablet Take 1,000 mg by mouth 2 (two) times daily. Call prescribing PD about prt op dose before starting pre op diet ask about post op dose    multivitamin with minerals tablet Take 1 tablet by mouth once daily.    rivaroxaban (XARELTO) 2.5 mg Tab Take 2.5 mg by mouth 2 (two) times daily.    sertraline (ZOLOFT) 100 MG tablet Take 150 mg by mouth.    sotaloL (BETAPACE) 160 MG Tab Take 160 mg by mouth 2 (two) times daily.    zinc gluconate 50 mg tablet Take 50 mg by mouth once daily.    estradioL (ESTRACE) 0.5 MG tablet Take 0.5 mg by mouth once daily. Stop 30 days pre op and post op    progesterone (PROMETRIUM) 100 MG capsule Take 100 mg by mouth once daily.    [DISCONTINUED] amLODIPine (NORVASC) 2.5 MG tablet amlodipine Take No date recorded No form recorded No frequency recorded No route recorded No set duration recorded No set duration amount recorded active No dosage strength recorded No dosage strength units of measure recorded    [DISCONTINUED] buPROPion (WELLBUTRIN) 100 MG tablet bupropion HCl Take No date recorded No form recorded No frequency recorded No route recorded No set duration recorded No set duration amount recorded active No dosage strength recorded No dosage strength units of measure recorded    [DISCONTINUED] butalbital-acetaminophen-caffeine -40 mg (FIORICET, ESGIC) -40 mg per tablet Take 1 tablet by mouth every 6 (six) hours as needed for Headaches. (Patient taking differently: Take 1 tablet by mouth every 6 (six) hours as needed for Headaches. Stop 2 wks pre op and 4 wks  "post op)    [DISCONTINUED] famotidine (PEPCID) 20 MG tablet Take 20 mg by mouth 2 (two) times daily.    [DISCONTINUED] topiramate (TOPAMAX) 25 MG tablet Take 25 mg by mouth 2 (two) times daily.     No current facility-administered medications on file prior to visit.     Review of patient's allergies indicates:   Allergen Reactions    Bactrim [sulfamethoxazole-trimethoprim]        OBJECTIVE:     Vitals:    03/28/24 1333   BP: 117/76   Pulse: 99   Weight: 107 kg (235 lb 12.8 oz)   Height: 5' 7" (1.702 m)     Body mass index is 36.93 kg/m².    Physical Exam:  General:  Well developed, well nourished, no acute distress  HEENT:  Normocephalic, atraumatic, PERRL, EOMI, clear sclera, neck supple  CVS:  RRR, S1 and S2 normal, no murmurs, rubs, gallops  Resp:  Lungs clear to auscultation, no wheezes, rales, rhonchi, cough  GI:  Abdomen soft, non-tender, non-distended, normoactive bowel sounds, no masses  :   Deferred  MSK:  No muscle atrophy, cyanosis, peripheral edema, full range of motion  Skin:  No rashes, ulcers, erythema  Neuro:  CNII-XII grossly intact  Psych:  Alert and oriented to person, place, and time    Results:  I have independently reviewed all pertinent lab and radiologic studies relevant to general surgery.      VISIT DIAGNOSES:       ICD-10-CM ICD-9-CM   1. Preop examination  Z01.818 V72.84       ASSESSMENT/PLAN:     Plan: Laproscopic Gastrojejunostomy    - Order esophagram, esophageal manometry/motility study, EGD if not done within 12 months.     - Dr. Hathaway discussed surgical options for anti-reflux procedure. Complete workup listed above then plan to have pt return to clinic to discuss surgery more in detail. Dr. Hathwaay discussed non-surgical options (continued medical mgmt, dietary/lifestyle changes) vs. surgical intervention (Laparoscopic Nissen fundoplication vs. Toupet fundoplication (if decreased motility)vs. Shahnaz Nissen gastroplasty), Laparoscopic repair of hiatal hernia, and magnetic " sphincter augmentation Linx procedure.         - Dr. Hathaway examined patient, discussed recommendations, obtained informed consent, and answered all questions with patient/family.     - Counseling included differential diagnoses, treatment options, risks and benefits, lifestyle changes, prognosis, and medications.    The patient was interactive, and verbalized understanding.    Malcom Hathaway MD

## 2024-04-01 ENCOUNTER — CLINICAL SUPPORT (OUTPATIENT)
Dept: BARIATRICS | Facility: HOSPITAL | Age: 59
End: 2024-04-01

## 2024-04-01 ENCOUNTER — TELEPHONE (OUTPATIENT)
Dept: SURGERY | Facility: CLINIC | Age: 59
End: 2024-04-01
Payer: MEDICARE

## 2024-04-01 VITALS — WEIGHT: 238.5 LBS | BODY MASS INDEX: 37.35 KG/M2

## 2024-04-01 DIAGNOSIS — E66.9 OBESITY, UNSPECIFIED CLASSIFICATION, UNSPECIFIED OBESITY TYPE, UNSPECIFIED WHETHER SERIOUS COMORBIDITY PRESENT: Primary | ICD-10-CM

## 2024-04-01 PROCEDURE — 94200034 HC BARIATRIC NUTRITIONAL SVCS PT GRADE I

## 2024-04-01 NOTE — PROGRESS NOTES
T/f pre op review     Read out load to the patient and she understood them all   Also review appropriate foods she can have on the rpe op diet taht she starts today.     Pt verbalized understanding

## 2024-04-01 NOTE — TELEPHONE ENCOUNTER
----- Message from Karla Abarca sent at 4/1/2024 12:03 PM CDT -----  Regarding: Request Reg.Meds (Dr. Hidalgo)  Patient came by the office stating she spoke w/ Dr. Hidalgo office regarding Blood Thinner.   They asked that we fax a request to them so they can document how long pt needs to stop prior to surgery.

## 2024-04-08 ENCOUNTER — CLINICAL SUPPORT (OUTPATIENT)
Dept: BARIATRICS | Facility: HOSPITAL | Age: 59
End: 2024-04-08

## 2024-04-08 VITALS — BODY MASS INDEX: 36.32 KG/M2 | WEIGHT: 231.88 LBS

## 2024-04-08 VITALS — WEIGHT: 231.88 LBS | BODY MASS INDEX: 36.32 KG/M2

## 2024-04-08 DIAGNOSIS — E66.9 OBESITY, UNSPECIFIED CLASSIFICATION, UNSPECIFIED OBESITY TYPE, UNSPECIFIED WHETHER SERIOUS COMORBIDITY PRESENT: Primary | ICD-10-CM

## 2024-04-08 DIAGNOSIS — E66.9 OBESITY: Primary | ICD-10-CM

## 2024-04-08 NOTE — PROGRESS NOTES
Pt here for 2 week pre op reviwed, reviewed pre op diet, T/F test and pre op questions.     Current wt: 231.9#

## 2024-04-08 NOTE — PROGRESS NOTES
Patient attended a preop education visit with the team. Patient's current weight is 231.9. She has lost 7 lbs. On the preop diet.    Patient missed 2 on questions 13-18. Corrections were discussed. No issues noted.     PRABHA Marino, CPT, CHC

## 2024-04-12 RX ORDER — CARBINOXAMINE MALEATE 4 MG/1
4 TABLET ORAL
COMMUNITY

## 2024-04-12 RX ORDER — MONTELUKAST SODIUM 10 MG/1
10 TABLET ORAL NIGHTLY
COMMUNITY

## 2024-04-15 ENCOUNTER — CLINICAL SUPPORT (OUTPATIENT)
Dept: BARIATRICS | Facility: HOSPITAL | Age: 59
End: 2024-04-15

## 2024-04-15 ENCOUNTER — PATIENT MESSAGE (OUTPATIENT)
Dept: ADMINISTRATIVE | Facility: OTHER | Age: 59
End: 2024-04-15
Payer: MEDICARE

## 2024-04-15 VITALS — HEIGHT: 67 IN | WEIGHT: 231.19 LBS | BODY MASS INDEX: 36.29 KG/M2

## 2024-04-15 DIAGNOSIS — E66.9 OBESITY (BMI 30-39.9): Primary | ICD-10-CM

## 2024-04-16 ENCOUNTER — PATIENT MESSAGE (OUTPATIENT)
Dept: ADMINISTRATIVE | Facility: OTHER | Age: 59
End: 2024-04-16
Payer: MEDICARE

## 2024-04-17 ENCOUNTER — ANESTHESIA EVENT (OUTPATIENT)
Dept: SURGERY | Facility: HOSPITAL | Age: 59
DRG: 620 | End: 2024-04-17
Payer: MEDICARE

## 2024-04-18 ENCOUNTER — PATIENT MESSAGE (OUTPATIENT)
Dept: ADMINISTRATIVE | Facility: OTHER | Age: 59
End: 2024-04-18
Payer: MEDICARE

## 2024-04-18 PROBLEM — E66.812 CLASS 2 DRUG-INDUCED OBESITY WITH BODY MASS INDEX (BMI) OF 36.0 TO 36.9 IN ADULT: Status: ACTIVE | Noted: 2024-04-18

## 2024-04-18 PROBLEM — Z98.890 HISTORY OF NISSEN FUNDOPLICATION: Status: ACTIVE | Noted: 2024-04-18

## 2024-04-18 PROBLEM — E66.1 CLASS 2 DRUG-INDUCED OBESITY WITH BODY MASS INDEX (BMI) OF 36.0 TO 36.9 IN ADULT: Status: ACTIVE | Noted: 2024-04-18

## 2024-04-18 NOTE — H&P
[facility] - Periop Services  General Surgery  History & Physical    Patient Name: Teagan Aldrich  MRN: [id number]  Admission Date: 4/22/24  Attending Physician: Malcom Hathaway MD   Primary Care Provider: Teagan Galdamez MD    Patient information was obtained from patient.     Subjective:     Chief Complaint/Reason for Admission: Reflux    History of Present Illness:  Patient is a 58 y.o. female presents for elective gastrojejunostomy/RNY GBP. No changes in medical history since last visit 3/28/24.    Clinical history:  Ms. Teagan Aldrich is a 58 y.o. female who presents to clinic for surgical evaluation of intractable reflux.      She underwent Lap Nissen in 2007.  Recently began to have recurrent reflux.     BMI 36.21     Has completed all education regarding GJ     Positive symptoms:   Heartburn     Previous treatments:   PPI, H2 Antagonist, and OTC Antacids     Referring provider: No ref. provider found   Patient Care Team:  Teagan Galdamez MD as PCP - General (Family Medicine)  Malcom Hathaway MD as Surgeon (Bariatrics)      Pertinent workup:     X-Ray Knee 3 View Left  Narrative: EXAMINATION:  XR KNEE 3 VIEW LEFT     CLINICAL HISTORY:  Presence of left artificial knee joint     TECHNIQUE:  AP, lateral, and Merchant views of the left knee were performed.     COMPARISON:  None     FINDINGS:  Left knee total arthroplasty is present, without evidence of hardware failure or loosening.  Alignment is anatomic.  No knee joint effusion is appreciated.  There is no focal soft tissue swelling.     The frontal view of the right knee shows no significant abnormality.  Impression: Left knee arthroplasty.  No hardware failure or loosening appreciated.     Electronically signed by:Gilson Liang  Date:                                        12/21/2023  Time:                                       15:41    No current facility-administered medications for this encounter.     Current  Outpatient Medications   Medication Sig Dispense Refill    ALPRAZolam (XANAX) 0.5 MG tablet Take 0.5 mg by mouth 2 (two) times daily as needed.      aspirin (ECOTRIN) 81 MG EC tablet Take 81 mg by mouth. Stop 5-7 days pre op and follow doctors instructions post op      atorvastatin (LIPITOR) 20 MG tablet Take 20 mg by mouth once daily.      carbinoxamine maleate 4 mg Tab Take 4 mg by mouth as needed.      diltiaZEM (CARDIZEM CD) 120 MG Cp24 Take 120 mg by mouth 3 (three) times daily.      esomeprazole (NEXIUM) 40 MG capsule Take 40 mg by mouth before breakfast.      isosorbide dinitrate (ISORDIL) 5 MG Tab Take 5 mg by mouth 2 (two) times daily.      levothyroxine (SYNTHROID) 50 MCG tablet Take 50 mcg by mouth before breakfast.      magnesium 200 mg Tab Take 400 mg by mouth once daily.      montelukast (SINGULAIR) 10 mg tablet Take 10 mg by mouth every evening.      rivaroxaban (XARELTO) 2.5 mg Tab Take 2.5 mg by mouth Daily.      sertraline (ZOLOFT) 100 MG tablet Take 150 mg by mouth every evening.      sotaloL (BETAPACE) 160 MG Tab Take 160 mg by mouth 2 (two) times daily.      zinc gluconate 50 mg tablet Take 50 mg by mouth once daily.      biotin 5,000 mcg TbDL Take by mouth. Hold postop      cholecalciferol, vitamin D3, (VITAMIN D3) 50 mcg (2,000 unit) Cap capsule Take by mouth once daily.      cranberry extract 500 mg Cap Take by mouth. Stop 2 wks pre op and 4 wks postop      estradioL (ESTRACE) 0.5 MG tablet Take 0.5 mg by mouth once daily. Stop 30 days pre op and post op      glipiZIDE (GLUCOTROL) 5 MG tablet Take 2.5 mg by mouth 2 (two) times daily. Ask orescribing doctor about pre op dose before starting pre op diet ask about post op      metFORMIN (GLUCOPHAGE) 1000 MG tablet Take 1,000 mg by mouth 2 (two) times daily. Call prescribing PD about prt op dose before starting pre op diet ask about post op dose      multivitamin with minerals tablet Take 1 tablet by mouth once daily.      progesterone  (PROMETRIUM) 100 MG capsule Take 100 mg by mouth once daily.         Review of patient's allergies indicates:   Allergen Reactions    Bactrim [sulfamethoxazole-trimethoprim]        Past Medical History:   Diagnosis Date    Abdominal pain     Anal polyp     Anxiety     Coronary artery disease     Depression     Diabetes mellitus     Disorder of lumbar spine     Diverticul disease small and large intestine, no perforati or abscess     GERD (gastroesophageal reflux disease)     Heart murmur     Heartburn     Hyperlipidemia     Hypertension     Irregular heart rhythm     Migraine     Morbid obesity     Nausea     Obesity     Panic attack     Sleep apnea     non use of equipment    SVT (supraventricular tachycardia)     Thyroid disease      Past Surgical History:   Procedure Laterality Date    BACK SURGERY      06/2022    CARPAL TUNNEL RELEASE Bilateral     CHOLECYSTECTOMY      COLONOSCOPY      ESOPHAGOGASTRODUODENOSCOPY      hemorroidectomy      JOINT REPLACEMENT      KNEE SURGERY      06/25/2023    LAMINECTOMY      LEFT HEART CATHETERIZATION      NECK SURGERY      12/2019    NISSEN FUNDOPLICATION  2007    ROTATOR CUFF REPAIR Right     TONSILLECTOMY      TUBAL LIGATION       Family History       Problem Relation (Age of Onset)    Depression Mother, Father    Diabetes Mother, Father    Heart disease Father    Hyperlipidemia Mother, Father    Hypertension Mother, Father          Tobacco Use    Smoking status: Former     Types: Cigarettes    Smokeless tobacco: Former   Substance and Sexual Activity    Alcohol use: Not Currently    Drug use: Never    Sexual activity: Not on file     Review of Systems   Constitutional: Negative.    HENT: Negative.     Eyes: Negative.    Respiratory: Negative.     Cardiovascular: Negative.    Gastrointestinal: Negative.         + heartburn   Endocrine: Negative.    Genitourinary: Negative.    Musculoskeletal: Negative.    Skin: Negative.    Allergic/Immunologic: Negative.    Neurological:  Negative.    Psychiatric/Behavioral: Negative.     All other systems reviewed and are negative.    Objective:     Vital Signs (Most Recent):    Vital Signs (24h Range):        Weight: 104.8 kg (231 lb)  Body mass index is 36.18 kg/m².    Physical Exam  Vitals reviewed.   Constitutional:       General: She is not in acute distress.     Appearance: Normal appearance.   HENT:      Head: Normocephalic.   Eyes:      Conjunctiva/sclera: Conjunctivae normal.      Pupils: Pupils are equal, round, and reactive to light.   Cardiovascular:      Rate and Rhythm: Normal rate and regular rhythm.      Pulses: Normal pulses.      Heart sounds: Normal heart sounds.   Pulmonary:      Effort: Pulmonary effort is normal. No respiratory distress.      Breath sounds: Normal breath sounds.   Abdominal:      General: Abdomen is flat. Bowel sounds are normal.      Palpations: Abdomen is soft.      Tenderness: There is no abdominal tenderness.   Musculoskeletal:         General: Normal range of motion.      Cervical back: Neck supple.   Skin:     General: Skin is warm and dry.   Neurological:      General: No focal deficit present.      Mental Status: She is alert and oriented to person, place, and time.   Psychiatric:         Mood and Affect: Mood normal.         Behavior: Behavior normal.         Significant Labs:  I have reviewed all pertinent lab results within the past 24 hours.    Significant Diagnostics:  I have reviewed all pertinent imaging results/findings within the past 24 hours.    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    Dr. Hathaway examined patient, obtained informed consent, and answered all questions.       KARLOS Greenberg  General Surgery  [facility] - Peri Services      I, KARLOS Hickey, am scribing for, and in the presence of, Malcom Hathaway MD, performed the above scribed service and the documentation accurately describes the services I performed. I attest to the accuracy of the note.

## 2024-04-19 ENCOUNTER — PATIENT MESSAGE (OUTPATIENT)
Dept: ADMINISTRATIVE | Facility: OTHER | Age: 59
End: 2024-04-19
Payer: MEDICARE

## 2024-04-19 NOTE — PRE-PROCEDURE INSTRUCTIONS
"[facility]: Outpatient Surgery  Preprocedure Check-In Instructions     Your arrival time for your surgery or procedure is _6:30 am_____.  We ask patients to arrive about 2 hours before surgery to allow for enough time to review your health history & medications, start your IV, complete any outstanding labwork or tests, and meet your Anesthesiologist.    Expectations: "Because of inconsistent procedure completion times, an unexpected wait may occur. The Physicians would like you to be here to prepare in the event they run ahead of time. We will make you as comfortable as possible and keep you informed. We apologize in advance if this happens."    You will arrive at [facility], [address], [place].  Enter through the [facility] entrance next to the Emergency Room, and come to the 6th floor to the Outpatient Surgery Department.     Visitory Policy:  You are allowed 2 adult visitors to be with you in the hospital. All hospital visitors should be in good current health.  No small children.     What to Bring:  Please have your ID, insurance cards, and all home medication bottles with you at check in.  Bring your CPAP machine if one is used at home.     Fasting:  Nothing to eat or drink after midnight the night before your procedure. This includes no ice, gum, hard candies, and/or tobacco products.  Follow your doctor's instructions for taking any medications on the morning of your procedure.  If no instructions for taking medications were given, do not take any medications but bring your medications in their bottles to your procedure check in.     Follow your doctor's preoperative instructions regarding skin prep, bowel prep, bathing, or showering prior to your procedure.  If any special soaps were provided to you, please use according to your doctor's instructions. If no instructions were given from your doctor, take a good bath or shower with antibacterial soap the night " before and the morning of your procedure.  On the morning of procedure, wear loose, comfortable clothing.  No lotions, makeup, perfumes, colognes, deodorant, or jewelry to your procedure.  Removable items (glasses, contact lenses, dentures, retainers, hearing aids) need to be removed for your procedure.  Bring your storage containers for these items if you wear them.     Artificial nails, body jewelry, eyelash extensions, and/or hair extensions with metal clips are not allowed during your surgery.  If you currently wear any of these items, please arrange for them to be removed prior to your arrival to the hospital.     Outpatient or Same Day Surgeries:  Any patients receiving sedation/anesthesia are advised not to drive for 24 hours after their procedure.  We do not allow patients to drive themselves home after discharge.  If you are going home after your procedure, please have someone available to drive you home from the hospital.        You may call the Outpatient Surgery Department at [phone] with any questions or concerns.  We are looking forward to meeting you and taking great care of you for your procedure.  Thank you for choosing [facility] for your surgical needs.

## 2024-04-20 ENCOUNTER — PATIENT MESSAGE (OUTPATIENT)
Dept: ADMINISTRATIVE | Facility: OTHER | Age: 59
End: 2024-04-20
Payer: MEDICARE

## 2024-04-21 ENCOUNTER — PATIENT MESSAGE (OUTPATIENT)
Dept: ADMINISTRATIVE | Facility: OTHER | Age: 59
End: 2024-04-21
Payer: MEDICARE

## 2024-04-22 ENCOUNTER — ANESTHESIA (OUTPATIENT)
Dept: SURGERY | Facility: HOSPITAL | Age: 59
DRG: 620 | End: 2024-04-22
Payer: MEDICARE

## 2024-04-22 ENCOUNTER — HOSPITAL ENCOUNTER (INPATIENT)
Facility: HOSPITAL | Age: 59
LOS: 3 days | Discharge: HOME | DRG: 620 | End: 2024-04-25
Attending: SURGERY | Admitting: SURGERY
Payer: MEDICARE

## 2024-04-22 DIAGNOSIS — D62 ACUTE BLOOD LOSS ANEMIA: Primary | ICD-10-CM

## 2024-04-22 DIAGNOSIS — E66.1: ICD-10-CM

## 2024-04-22 DIAGNOSIS — K21.9 GASTROESOPHAGEAL REFLUX DISEASE, UNSPECIFIED WHETHER ESOPHAGITIS PRESENT: ICD-10-CM

## 2024-04-22 DIAGNOSIS — Z98.890 HISTORY OF NISSEN FUNDOPLICATION: ICD-10-CM

## 2024-04-22 LAB
ABORH RETYPE: NORMAL
GROUP & RH: NORMAL
INDIRECT COOMBS: NORMAL
POCT GLUCOSE: 127 MG/DL (ref 70–110)
POCT GLUCOSE: 178 MG/DL (ref 70–110)
POCT GLUCOSE: 183 MG/DL (ref 70–110)
SPECIMEN OUTDATE: NORMAL

## 2024-04-22 PROCEDURE — 63600175 PHARM REV CODE 636 W HCPCS: Mod: JZ,JG | Performed by: SURGERY

## 2024-04-22 PROCEDURE — 71000033 HC RECOVERY, INTIAL HOUR: Performed by: SURGERY

## 2024-04-22 PROCEDURE — 36000711: Performed by: SURGERY

## 2024-04-22 PROCEDURE — 63600175 PHARM REV CODE 636 W HCPCS: Performed by: ANESTHESIOLOGY

## 2024-04-22 PROCEDURE — 63600175 PHARM REV CODE 636 W HCPCS: Performed by: NURSE PRACTITIONER

## 2024-04-22 PROCEDURE — 88307 TISSUE EXAM BY PATHOLOGIST: CPT | Performed by: SURGERY

## 2024-04-22 PROCEDURE — 25000003 PHARM REV CODE 250

## 2024-04-22 PROCEDURE — 37000008 HC ANESTHESIA 1ST 15 MINUTES: Performed by: SURGERY

## 2024-04-22 PROCEDURE — 37000009 HC ANESTHESIA EA ADD 15 MINS: Performed by: SURGERY

## 2024-04-22 PROCEDURE — 0D164ZA BYPASS STOMACH TO JEJUNUM, PERCUTANEOUS ENDOSCOPIC APPROACH: ICD-10-PCS | Performed by: SURGERY

## 2024-04-22 PROCEDURE — 63600175 PHARM REV CODE 636 W HCPCS

## 2024-04-22 PROCEDURE — 71000039 HC RECOVERY, EACH ADD'L HOUR: Performed by: SURGERY

## 2024-04-22 PROCEDURE — 99900031 HC PATIENT EDUCATION (STAT)

## 2024-04-22 PROCEDURE — 0DQ44ZZ REPAIR ESOPHAGOGASTRIC JUNCTION, PERCUTANEOUS ENDOSCOPIC APPROACH: ICD-10-PCS | Performed by: SURGERY

## 2024-04-22 PROCEDURE — 25000003 PHARM REV CODE 250: Performed by: NURSE PRACTITIONER

## 2024-04-22 PROCEDURE — D9220A PRA ANESTHESIA: Mod: CRNA,,,

## 2024-04-22 PROCEDURE — 86850 RBC ANTIBODY SCREEN: CPT | Performed by: NURSE PRACTITIONER

## 2024-04-22 PROCEDURE — C1729 CATH, DRAINAGE: HCPCS | Performed by: SURGERY

## 2024-04-22 PROCEDURE — D9220A PRA ANESTHESIA: Mod: ANES,,, | Performed by: ANESTHESIOLOGY

## 2024-04-22 PROCEDURE — 36000710: Performed by: SURGERY

## 2024-04-22 PROCEDURE — C9290 INJ, BUPIVACAINE LIPOSOME: HCPCS | Performed by: SURGERY

## 2024-04-22 PROCEDURE — 27201423 OPTIME MED/SURG SUP & DEVICES STERILE SUPPLY: Performed by: SURGERY

## 2024-04-22 PROCEDURE — 94799 UNLISTED PULMONARY SVC/PX: CPT

## 2024-04-22 PROCEDURE — 11000001 HC ACUTE MED/SURG PRIVATE ROOM

## 2024-04-22 PROCEDURE — 43644 LAP GASTRIC BYPASS/ROUX-EN-Y: CPT | Mod: 80,,, | Performed by: SURGERY

## 2024-04-22 PROCEDURE — 43644 LAP GASTRIC BYPASS/ROUX-EN-Y: CPT | Mod: ,,, | Performed by: SURGERY

## 2024-04-22 RX ORDER — TRAMADOL HYDROCHLORIDE 50 MG/1
100 TABLET ORAL EVERY 6 HOURS PRN
Status: DISCONTINUED | OUTPATIENT
Start: 2024-04-22 | End: 2024-04-25 | Stop reason: HOSPADM

## 2024-04-22 RX ORDER — ACETAMINOPHEN 10 MG/ML
1000 INJECTION, SOLUTION INTRAVENOUS EVERY 8 HOURS
Status: COMPLETED | OUTPATIENT
Start: 2024-04-22 | End: 2024-04-23

## 2024-04-22 RX ORDER — HYDROMORPHONE HYDROCHLORIDE 2 MG/ML
0.4 INJECTION, SOLUTION INTRAMUSCULAR; INTRAVENOUS; SUBCUTANEOUS EVERY 5 MIN PRN
Status: DISCONTINUED | OUTPATIENT
Start: 2024-04-22 | End: 2024-04-22

## 2024-04-22 RX ORDER — SOTALOL HYDROCHLORIDE 80 MG/1
160 TABLET ORAL 2 TIMES DAILY
Status: DISCONTINUED | OUTPATIENT
Start: 2024-04-23 | End: 2024-04-23

## 2024-04-22 RX ORDER — ONDANSETRON HYDROCHLORIDE 2 MG/ML
4 INJECTION, SOLUTION INTRAVENOUS EVERY 6 HOURS PRN
Status: DISCONTINUED | OUTPATIENT
Start: 2024-04-22 | End: 2024-04-25 | Stop reason: HOSPADM

## 2024-04-22 RX ORDER — ACETAMINOPHEN 500 MG
1000 TABLET ORAL
Status: COMPLETED | OUTPATIENT
Start: 2024-04-22 | End: 2024-04-22

## 2024-04-22 RX ORDER — PANTOPRAZOLE SODIUM 40 MG/1
40 TABLET, DELAYED RELEASE ORAL DAILY
Status: DISCONTINUED | OUTPATIENT
Start: 2024-04-23 | End: 2024-04-25 | Stop reason: HOSPADM

## 2024-04-22 RX ORDER — GLUCAGON 1 MG
1 KIT INJECTION
Status: DISCONTINUED | OUTPATIENT
Start: 2024-04-22 | End: 2024-04-25 | Stop reason: HOSPADM

## 2024-04-22 RX ORDER — SODIUM CHLORIDE, SODIUM LACTATE, POTASSIUM CHLORIDE, CALCIUM CHLORIDE 600; 310; 30; 20 MG/100ML; MG/100ML; MG/100ML; MG/100ML
INJECTION, SOLUTION INTRAVENOUS CONTINUOUS
Status: DISCONTINUED | OUTPATIENT
Start: 2024-04-22 | End: 2024-04-25 | Stop reason: HOSPADM

## 2024-04-22 RX ORDER — HEPARIN SODIUM 5000 [USP'U]/ML
5000 INJECTION, SOLUTION INTRAVENOUS; SUBCUTANEOUS
Status: COMPLETED | OUTPATIENT
Start: 2024-04-22 | End: 2024-04-22

## 2024-04-22 RX ORDER — IBUPROFEN 200 MG
24 TABLET ORAL
Status: DISCONTINUED | OUTPATIENT
Start: 2024-04-22 | End: 2024-04-25 | Stop reason: HOSPADM

## 2024-04-22 RX ORDER — PROMETHAZINE HYDROCHLORIDE 25 MG/1
25 TABLET ORAL EVERY 6 HOURS PRN
Status: DISCONTINUED | OUTPATIENT
Start: 2024-04-22 | End: 2024-04-25 | Stop reason: HOSPADM

## 2024-04-22 RX ORDER — ALPRAZOLAM 0.5 MG/1
0.5 TABLET ORAL 2 TIMES DAILY PRN
Status: DISCONTINUED | OUTPATIENT
Start: 2024-04-23 | End: 2024-04-25 | Stop reason: HOSPADM

## 2024-04-22 RX ORDER — ROCURONIUM BROMIDE 10 MG/ML
INJECTION, SOLUTION INTRAVENOUS
Status: DISCONTINUED | OUTPATIENT
Start: 2024-04-22 | End: 2024-04-22

## 2024-04-22 RX ORDER — SUCCINYLCHOLINE CHLORIDE 20 MG/ML
INJECTION INTRAMUSCULAR; INTRAVENOUS
Status: DISCONTINUED | OUTPATIENT
Start: 2024-04-22 | End: 2024-04-22

## 2024-04-22 RX ORDER — LABETALOL HYDROCHLORIDE 5 MG/ML
10 INJECTION, SOLUTION INTRAVENOUS
Status: DISCONTINUED | OUTPATIENT
Start: 2024-04-22 | End: 2024-04-25 | Stop reason: HOSPADM

## 2024-04-22 RX ORDER — HYOSCYAMINE SULFATE 0.12 MG/1
0.12 TABLET SUBLINGUAL EVERY 4 HOURS PRN
Status: DISCONTINUED | OUTPATIENT
Start: 2024-04-22 | End: 2024-04-25 | Stop reason: HOSPADM

## 2024-04-22 RX ORDER — MIDAZOLAM HYDROCHLORIDE 2 MG/2ML
2 INJECTION, SOLUTION INTRAMUSCULAR; INTRAVENOUS ONCE
Status: DISCONTINUED | OUTPATIENT
Start: 2024-04-22 | End: 2024-04-22 | Stop reason: HOSPADM

## 2024-04-22 RX ORDER — INSULIN ASPART 100 [IU]/ML
1-10 INJECTION, SOLUTION INTRAVENOUS; SUBCUTANEOUS
Status: DISCONTINUED | OUTPATIENT
Start: 2024-04-22 | End: 2024-04-25 | Stop reason: HOSPADM

## 2024-04-22 RX ORDER — LIDOCAINE HYDROCHLORIDE 20 MG/ML
INJECTION, SOLUTION EPIDURAL; INFILTRATION; INTRACAUDAL; PERINEURAL
Status: DISCONTINUED | OUTPATIENT
Start: 2024-04-22 | End: 2024-04-22

## 2024-04-22 RX ORDER — SOTALOL HYDROCHLORIDE 80 MG/1
80 TABLET ORAL 2 TIMES DAILY
Status: DISCONTINUED | OUTPATIENT
Start: 2024-04-23 | End: 2024-04-25 | Stop reason: HOSPADM

## 2024-04-22 RX ORDER — EPHEDRINE SULFATE 50 MG/ML
INJECTION, SOLUTION INTRAVENOUS
Status: DISCONTINUED | OUTPATIENT
Start: 2024-04-22 | End: 2024-04-22

## 2024-04-22 RX ORDER — ONDANSETRON HYDROCHLORIDE 2 MG/ML
4 INJECTION, SOLUTION INTRAVENOUS ONCE AS NEEDED
Status: DISCONTINUED | OUTPATIENT
Start: 2024-04-22 | End: 2024-04-22

## 2024-04-22 RX ORDER — ACETAMINOPHEN 500 MG
1000 TABLET ORAL EVERY 8 HOURS
Status: DISCONTINUED | OUTPATIENT
Start: 2024-04-23 | End: 2024-04-25 | Stop reason: HOSPADM

## 2024-04-22 RX ORDER — MIDAZOLAM HYDROCHLORIDE 1 MG/ML
INJECTION INTRAMUSCULAR; INTRAVENOUS
Status: DISCONTINUED | OUTPATIENT
Start: 2024-04-22 | End: 2024-04-22

## 2024-04-22 RX ORDER — HYDRALAZINE HYDROCHLORIDE 20 MG/ML
10 INJECTION INTRAMUSCULAR; INTRAVENOUS EVERY 6 HOURS PRN
Status: DISCONTINUED | OUTPATIENT
Start: 2024-04-22 | End: 2024-04-25 | Stop reason: HOSPADM

## 2024-04-22 RX ORDER — SERTRALINE HYDROCHLORIDE 50 MG/1
150 TABLET, FILM COATED ORAL NIGHTLY
Status: DISCONTINUED | OUTPATIENT
Start: 2024-04-23 | End: 2024-04-25 | Stop reason: HOSPADM

## 2024-04-22 RX ORDER — ONDANSETRON 4 MG/1
4 TABLET, ORALLY DISINTEGRATING ORAL
Status: COMPLETED | OUTPATIENT
Start: 2024-04-22 | End: 2024-04-22

## 2024-04-22 RX ORDER — KETOROLAC TROMETHAMINE 30 MG/ML
30 INJECTION, SOLUTION INTRAMUSCULAR; INTRAVENOUS EVERY 6 HOURS
Status: DISCONTINUED | OUTPATIENT
Start: 2024-04-22 | End: 2024-04-24

## 2024-04-22 RX ORDER — CEFAZOLIN SODIUM 2 G/50ML
2 SOLUTION INTRAVENOUS
Status: DISCONTINUED | OUTPATIENT
Start: 2024-04-22 | End: 2024-04-22 | Stop reason: HOSPADM

## 2024-04-22 RX ORDER — LIDOCAINE HYDROCHLORIDE 10 MG/ML
1 INJECTION, SOLUTION EPIDURAL; INFILTRATION; INTRACAUDAL; PERINEURAL ONCE
Status: DISCONTINUED | OUTPATIENT
Start: 2024-04-22 | End: 2024-04-22 | Stop reason: HOSPADM

## 2024-04-22 RX ORDER — PROCHLORPERAZINE EDISYLATE 5 MG/ML
5 INJECTION INTRAMUSCULAR; INTRAVENOUS EVERY 6 HOURS PRN
Status: DISCONTINUED | OUTPATIENT
Start: 2024-04-22 | End: 2024-04-25 | Stop reason: HOSPADM

## 2024-04-22 RX ORDER — HEPARIN SODIUM 5000 [USP'U]/ML
5000 INJECTION, SOLUTION INTRAVENOUS; SUBCUTANEOUS DAILY
Status: DISCONTINUED | OUTPATIENT
Start: 2024-04-23 | End: 2024-04-23

## 2024-04-22 RX ORDER — PHENYLEPHRINE HCL IN 0.9% NACL 1 MG/10 ML
SYRINGE (ML) INTRAVENOUS
Status: DISCONTINUED | OUTPATIENT
Start: 2024-04-22 | End: 2024-04-22

## 2024-04-22 RX ORDER — SOTALOL HYDROCHLORIDE 160 MG/1
80 TABLET ORAL 2 TIMES DAILY
COMMUNITY

## 2024-04-22 RX ORDER — CLONIDINE 0.1 MG/24H
1 PATCH, EXTENDED RELEASE TRANSDERMAL ONCE AS NEEDED
Status: DISCONTINUED | OUTPATIENT
Start: 2024-04-22 | End: 2024-04-25 | Stop reason: HOSPADM

## 2024-04-22 RX ORDER — ISOSORBIDE DINITRATE 5 MG/1
5 TABLET ORAL 2 TIMES DAILY
Status: DISCONTINUED | OUTPATIENT
Start: 2024-04-23 | End: 2024-04-25 | Stop reason: HOSPADM

## 2024-04-22 RX ORDER — IBUPROFEN 200 MG
16 TABLET ORAL
Status: DISCONTINUED | OUTPATIENT
Start: 2024-04-22 | End: 2024-04-25 | Stop reason: HOSPADM

## 2024-04-22 RX ORDER — ONDANSETRON 4 MG/1
4 TABLET, ORALLY DISINTEGRATING ORAL EVERY 6 HOURS PRN
Status: DISCONTINUED | OUTPATIENT
Start: 2024-04-23 | End: 2024-04-25 | Stop reason: HOSPADM

## 2024-04-22 RX ORDER — DILTIAZEM HYDROCHLORIDE 120 MG/1
120 CAPSULE, COATED, EXTENDED RELEASE ORAL 3 TIMES DAILY
Status: DISCONTINUED | OUTPATIENT
Start: 2024-04-23 | End: 2024-04-25 | Stop reason: HOSPADM

## 2024-04-22 RX ORDER — GABAPENTIN 300 MG/1
600 CAPSULE ORAL
Status: COMPLETED | OUTPATIENT
Start: 2024-04-22 | End: 2024-04-22

## 2024-04-22 RX ORDER — DEXAMETHASONE SODIUM PHOSPHATE 4 MG/ML
INJECTION, SOLUTION INTRA-ARTICULAR; INTRALESIONAL; INTRAMUSCULAR; INTRAVENOUS; SOFT TISSUE
Status: DISCONTINUED | OUTPATIENT
Start: 2024-04-22 | End: 2024-04-22

## 2024-04-22 RX ORDER — LEVOTHYROXINE SODIUM 50 UG/1
50 TABLET ORAL
Status: DISCONTINUED | OUTPATIENT
Start: 2024-04-23 | End: 2024-04-25 | Stop reason: HOSPADM

## 2024-04-22 RX ORDER — CELECOXIB 200 MG/1
200 CAPSULE ORAL
Status: COMPLETED | OUTPATIENT
Start: 2024-04-22 | End: 2024-04-22

## 2024-04-22 RX ORDER — FAMOTIDINE 20 MG/1
20 TABLET, FILM COATED ORAL DAILY
Status: DISCONTINUED | OUTPATIENT
Start: 2024-04-23 | End: 2024-04-25 | Stop reason: HOSPADM

## 2024-04-22 RX ORDER — FENTANYL CITRATE 50 UG/ML
INJECTION, SOLUTION INTRAMUSCULAR; INTRAVENOUS
Status: DISCONTINUED | OUTPATIENT
Start: 2024-04-22 | End: 2024-04-22

## 2024-04-22 RX ORDER — BUPIVACAINE HYDROCHLORIDE 5 MG/ML
INJECTION, SOLUTION EPIDURAL; INTRACAUDAL
Status: DISCONTINUED | OUTPATIENT
Start: 2024-04-22 | End: 2024-04-22 | Stop reason: HOSPADM

## 2024-04-22 RX ORDER — FAMOTIDINE 20 MG/1
20 TABLET, FILM COATED ORAL DAILY
COMMUNITY

## 2024-04-22 RX ORDER — ONDANSETRON HYDROCHLORIDE 2 MG/ML
INJECTION, SOLUTION INTRAMUSCULAR; INTRAVENOUS
Status: DISCONTINUED | OUTPATIENT
Start: 2024-04-22 | End: 2024-04-22

## 2024-04-22 RX ORDER — PROPOFOL 10 MG/ML
VIAL (ML) INTRAVENOUS
Status: DISCONTINUED | OUTPATIENT
Start: 2024-04-22 | End: 2024-04-22

## 2024-04-22 RX ORDER — METHOCARBAMOL 100 MG/ML
1000 INJECTION, SOLUTION INTRAMUSCULAR; INTRAVENOUS ONCE
Status: COMPLETED | OUTPATIENT
Start: 2024-04-22 | End: 2024-04-22

## 2024-04-22 RX ADMIN — ROCURONIUM BROMIDE 50 MG: 10 SOLUTION INTRAVENOUS at 08:04

## 2024-04-22 RX ADMIN — MIDAZOLAM HYDROCHLORIDE 2 MG: 1 INJECTION, SOLUTION INTRAMUSCULAR; INTRAVENOUS at 08:04

## 2024-04-22 RX ADMIN — Medication 100 MCG: at 09:04

## 2024-04-22 RX ADMIN — INSULIN ASPART 2 UNITS: 100 INJECTION, SOLUTION INTRAVENOUS; SUBCUTANEOUS at 07:04

## 2024-04-22 RX ADMIN — ACETAMINOPHEN 1000 MG: 10 INJECTION, SOLUTION INTRAVENOUS at 01:04

## 2024-04-22 RX ADMIN — INSULIN ASPART 2 UNITS: 100 INJECTION, SOLUTION INTRAVENOUS; SUBCUTANEOUS at 01:04

## 2024-04-22 RX ADMIN — Medication 100 MCG: at 10:04

## 2024-04-22 RX ADMIN — FENTANYL CITRATE 100 MCG: 50 INJECTION, SOLUTION INTRAMUSCULAR; INTRAVENOUS at 08:04

## 2024-04-22 RX ADMIN — KETOROLAC TROMETHAMINE 30 MG: 30 INJECTION, SOLUTION INTRAMUSCULAR at 12:04

## 2024-04-22 RX ADMIN — EPHEDRINE SULFATE 10 MG: 50 INJECTION INTRAVENOUS at 09:04

## 2024-04-22 RX ADMIN — PROPOFOL 200 MG: 10 INJECTION, EMULSION INTRAVENOUS at 08:04

## 2024-04-22 RX ADMIN — SODIUM CHLORIDE, SODIUM GLUCONATE, SODIUM ACETATE, POTASSIUM CHLORIDE AND MAGNESIUM CHLORIDE: 526; 502; 368; 37; 30 INJECTION, SOLUTION INTRAVENOUS at 09:04

## 2024-04-22 RX ADMIN — Medication 100 MCG: at 08:04

## 2024-04-22 RX ADMIN — ROCURONIUM BROMIDE 20 MG: 10 SOLUTION INTRAVENOUS at 10:04

## 2024-04-22 RX ADMIN — DEXAMETHASONE SODIUM PHOSPHATE 8 MG: 4 INJECTION, SOLUTION INTRA-ARTICULAR; INTRALESIONAL; INTRAMUSCULAR; INTRAVENOUS; SOFT TISSUE at 08:04

## 2024-04-22 RX ADMIN — METHOCARBAMOL 1000 MG: 100 INJECTION INTRAMUSCULAR; INTRAVENOUS at 12:04

## 2024-04-22 RX ADMIN — HYDROMORPHONE HYDROCHLORIDE 0.4 MG: 2 INJECTION INTRAMUSCULAR; INTRAVENOUS; SUBCUTANEOUS at 12:04

## 2024-04-22 RX ADMIN — HEPARIN SODIUM 5000 UNITS: 5000 INJECTION, SOLUTION INTRAVENOUS; SUBCUTANEOUS at 06:04

## 2024-04-22 RX ADMIN — ACETAMINOPHEN 1000 MG: 500 TABLET ORAL at 06:04

## 2024-04-22 RX ADMIN — SUGAMMADEX 200 MG: 100 INJECTION, SOLUTION INTRAVENOUS at 11:04

## 2024-04-22 RX ADMIN — ONDANSETRON 4 MG: 4 TABLET, ORALLY DISINTEGRATING ORAL at 06:04

## 2024-04-22 RX ADMIN — SODIUM CHLORIDE, POTASSIUM CHLORIDE, SODIUM LACTATE AND CALCIUM CHLORIDE: 600; 310; 30; 20 INJECTION, SOLUTION INTRAVENOUS at 01:04

## 2024-04-22 RX ADMIN — ACETAMINOPHEN 1000 MG: 10 INJECTION, SOLUTION INTRAVENOUS at 09:04

## 2024-04-22 RX ADMIN — GABAPENTIN 600 MG: 300 CAPSULE ORAL at 06:04

## 2024-04-22 RX ADMIN — SUCCINYLCHOLINE CHLORIDE 180 MG: 20 INJECTION, SOLUTION INTRAMUSCULAR; INTRAVENOUS at 08:04

## 2024-04-22 RX ADMIN — ONDANSETRON 4 MG: 2 INJECTION INTRAMUSCULAR; INTRAVENOUS at 02:04

## 2024-04-22 RX ADMIN — ROCURONIUM BROMIDE 10 MG: 10 SOLUTION INTRAVENOUS at 08:04

## 2024-04-22 RX ADMIN — ONDANSETRON 4 MG: 2 INJECTION INTRAMUSCULAR; INTRAVENOUS at 11:04

## 2024-04-22 RX ADMIN — SODIUM CHLORIDE, POTASSIUM CHLORIDE, SODIUM LACTATE AND CALCIUM CHLORIDE: 600; 310; 30; 20 INJECTION, SOLUTION INTRAVENOUS at 09:04

## 2024-04-22 RX ADMIN — CEFAZOLIN SODIUM 2 G: 2 SOLUTION INTRAVENOUS at 08:04

## 2024-04-22 RX ADMIN — KETOROLAC TROMETHAMINE 30 MG: 30 INJECTION, SOLUTION INTRAMUSCULAR at 06:04

## 2024-04-22 RX ADMIN — SODIUM CHLORIDE, SODIUM GLUCONATE, SODIUM ACETATE, POTASSIUM CHLORIDE AND MAGNESIUM CHLORIDE: 526; 502; 368; 37; 30 INJECTION, SOLUTION INTRAVENOUS at 08:04

## 2024-04-22 RX ADMIN — CELECOXIB 200 MG: 200 CAPSULE ORAL at 06:04

## 2024-04-22 RX ADMIN — LIDOCAINE HYDROCHLORIDE 120 MG: 20 INJECTION, SOLUTION EPIDURAL; INFILTRATION; INTRACAUDAL; PERINEURAL at 08:04

## 2024-04-22 NOTE — ANESTHESIA POSTPROCEDURE EVALUATION
Anesthesia Post Evaluation    Patient: Teagan Aldrich    Procedure(s) Performed: Procedure(s) (LRB):  CREATION, BYPASS, DUODENUM TO JEJUNUM, LAPAROSCOPIC (N/A)    Final Anesthesia Type: general      Patient location during evaluation: PACU  Patient participation: Yes- Able to Participate  Level of consciousness: oriented and sedated  Post-procedure vital signs: reviewed and stable  Pain management: adequate  Airway patency: patent    PONV status at discharge: No PONV  Anesthetic complications: no      Cardiovascular status: hemodynamically stable  Respiratory status: spontaneous ventilation and unassisted  Hydration status: euvolemic  Follow-up not needed.  Comments: [facility]              Vitals Value Taken Time   /62 04/22/24 1257   Temp 36.7 °C (98.1 °F) 04/22/24 1257   Pulse 78 04/22/24 1317   Resp 18 04/22/24 1247   SpO2 94 % 04/22/24 1317   Vitals shown include unfiled device data.      Event Time   Out of Recovery 12:50:24         Pain/Ran Score: Pain Rating Prior to Med Admin: 1 (4/22/2024  1:15 PM)  Ran Score: 9 (4/22/2024 12:30 PM)

## 2024-04-22 NOTE — BRIEF OP NOTE
[facility] - Periop Services  Brief Operative Note    SUMMARY     Surgery Date: 4/22/2024     Surgeons and Role:     * Malcom Hathaway MD - Primary     * Danny Bernstein MD    Assist: KARLOS Greenberg      Pre-op Diagnosis:  Gastroesophageal reflux disease, unspecified whether esophagitis present [K21.9]    Post-op Diagnosis:  Post-Op Diagnosis Codes:     * Gastroesophageal reflux disease, unspecified whether esophagitis present [K21.9]    Procedure: Laparoscopic takedown of previous Nissen fundoplication, Laparoscopic gastrojejunostomy with Gisela-en-Y reconstruction, BELKYS block, placement of ZIA drain     Anesthesia: General        Operative Findings: dictated per surgeon     Estimated Blood Loss: 50 mL    Estimated Blood Loss has been documented.         Specimens:   Specimen (24h ago, onward)       Start     Ordered    04/22/24 1120  Specimen to Pathology  RELEASE UPON ORDERING        References:    Click here for ordering Quick Tip   Question:  Release to patient  Answer:  Immediate    04/22/24 2910                    [id number]

## 2024-04-22 NOTE — NURSING
Nurses Note -- 4 Eyes      4/22/2024   3:22 PM      Skin assessed during: Admit      [x] No Altered Skin Integrity Present    [x]Prevention Measures Documented      [] Yes- Altered Skin Integrity Present or Discovered   [] LDA Added if Not in Epic (Describe Wound)   [] New Altered Skin Integrity was Present on Admit and Documented in LDA   [] Wound Image Taken    Wound Care Consulted? No    Attending Nurse:  Berlin Acosta RN     Second RN/Staff Member: Miya Garcia RN

## 2024-04-22 NOTE — ANESTHESIA PROCEDURE NOTES
Arterial    Diagnosis: A Fib    Patient location during procedure: done in OR  Timeout: 4/22/2024 8:31 AM  Procedure end time: 4/22/2024 8:39 AM    Staffing  Authorizing Provider: Get Dacosta MD  Performing Provider: Get Dacosta MD    Staffing  Performed by: Get Dacosta MD  Authorized by: Get Dacosta MD    Anesthesiologist was present at the time of the procedure.    Preanesthetic Checklist  Completed: patient identified, IV checked, site marked, risks and benefits discussed, surgical consent, monitors and equipment checked, pre-op evaluation, timeout performed and anesthesia consent givenArterial  Skin Prep: chlorhexidine gluconate and isopropyl alcohol  Local Infiltration: none  Location: radial    Catheter Size: 20 G  Catheter placement by Ultrasound guidance. Heme positive aspiration all ports.   Vessel Caliber: medium, patent, compressibility normal  Vascular Doppler:  not done  Needle advanced into vessel with real time Ultrasound guidance.  Sterile sheath used.Insertion Attempts: 2  Assessment  Dressing: secured with tape and tegaderm  Patient: Tolerated well  Additional Notes  Placed after induction of GA

## 2024-04-22 NOTE — OP NOTE
[facility] - Periop Services  Surgery Department  Operative Note    SUMMARY     Date of Procedure: 4/22/2024     Procedure: Procedure(s) (LRB):  CREATION, BYPASS, DUODENUM TO JEJUNUM, LAPAROSCOPIC (N/A)       Surgeons and Role:     * Malcom Hathaway MD - Primary     * Danny Bernstein MD    Assisting Surgeon: None    Pre-Operative Diagnosis: Gastroesophageal reflux disease, unspecified whether esophagitis present [K21.9]    Post-Operative Diagnosis: Post-Op Diagnosis Codes:     * Gastroesophageal reflux disease, unspecified whether esophagitis present [K21.9]    Anesthesia: General    Indication: Morbid Obesity and other co-morbid conditions.  Assistant at surgery required due to anatomical considerations.    Operative Findings (including complications, if any: Hiatal Hernia    Description of Technical Procedures: Patient was taken to the operating room. After the induction of anesthesia the abdomen was prepped and draped in the usual sterile fashion. An optical trocar was used to access the peritoneum. Pneumoperitoneum was completed under direct vision. Additional working ports were placed under direct vision.   Adhesions removed .  The greater omentum was transected vertically with harmonic scapel.  The ligament of Trietz was identified and 35 cm from this the jejenum was transected with a linear stapler.  115 cm from this towards the IC valve a jejenojejenostomy was created with linear stapler in the standard fashion.  The common channel was closed with a running 3-0 suture.  The mesenteric defect was closed with a running silk suture.  A liver retractor was placed. The patient had a recurrent hiatal hernia defect with posterior wrap herniating into the chest.  Meticulous adhesions taken down. The crura were identified, the wrap was completely reduced.  There was adequate esophageal length.  The previous wrap was taken down and placed into anatomic position.    The lesser curve was bluntly  dissected into the lesser sac.  The vagus was spared.  Gastric pouch was created at the level of the 2nd transverse gastric vein with linear stapler towards the angle of His excluding the fundus.  A fundectomy was performed, hemostasis assured. A trans oral anvil delivery system was created with a 25 circular stapler anvil.  This was passed trans orally into the gastric pouch.  This was brought through a pouch gastrotomy.  The delivery system was removed and the anvil was seated in position.  The daryl limb was brought into the upper abdomen in the appropriate position and orientation.  The limb was opened and the circular stapler was placed.  The spike was brought through the antimesenteric border and mated with the anvil.  The limb was closed with stapler.  The blind limb was very short.  Lembert sutures used to reinforce the anastomosis which was tension free.  The staple line was intact and hemostatic.  The anastomois was checked was air insufflation test and there was no leak.    A drain was placed the liver retractor was removed under direct vision. Fascia closed 0 Vicryl skin closed 4-0 Vicryl patient tolerated procedure well.     Significant Surgical Tasks Conducted by the Assistant(s), if Applicable: The assistant at surgery was required to manipulate tissue and organs to facilitate safe surgery.    Estimated Blood Loss (EBL): 50 mL           Implants: * No implants in log *    Specimens:   Specimen (24h ago, onward)       Start     Ordered    04/22/24 1120  Specimen to Pathology  RELEASE UPON ORDERING        References:    Click here for ordering Quick Tip   Question:  Release to patient  Answer:  Immediate    04/22/24 1120                            Condition: Good    Disposition: PACU - hemodynamically stable.    Attestation: I was present and scrubbed for the entire procedure.

## 2024-04-22 NOTE — ANESTHESIA PREPROCEDURE EVALUATION
04/21/2024  Teagan Aldrich is a 58 y.o., female, who presents for the following:    Procedure: CREATION, BYPASS, DUODENUM TO JEJUNUM, LAPAROSCOPIC (Abdomen) - Lap RNY GBP.Dr. Danny Bernstein to assist   Anesthesia type: General   Diagnosis: Gastroesophageal reflux disease, unspecified whether esophagitis present [K21.9]   Pre-op diagnosis: Gastroesophageal reflux disease, unspecified whether esophagitis present [K21.9]   Location: [facility] OR [address] [place] [address] OR   Surgeons: Malcom Hathaway MD     Cardiac Clearance:            Past Medical History:   Diagnosis Date    Abdominal pain      Anal polyp      Anxiety      Coronary artery disease      Depression      Diabetes mellitus      Disorder of lumbar spine      Diverticul disease small and large intestine, no perforati or abscess      GERD (gastroesophageal reflux disease)      Heart murmur      Heartburn      Hyperlipidemia      Hypertension      Irregular heart rhythm      Migraine      Morbid obesity      Nausea      Obesity      Panic attack      Sleep apnea       non use of equipment    SVT (supraventricular tachycardia)      Thyroid disease      History of Present Illness:  Patient is a 58 y.o. female presents for elective gastrojejunostomy/RNY GBP. No changes in medical history since last visit 3/28/24.     Clinical history:  Ms. Teagan Aldrich is a 58 y.o. female who presents to clinic for surgical evaluation of intractable reflux.      She underwent Lap Nissen in 2007.  Recently began to have recurrent reflux.    Plan:  Laparoscopic takedown of previous Nissen fundoplication, Laparoscopic repair of hiatal hernia, Laparoscopic gastrojejunostomy Gisela-en-Y reconstruction, and other indicated procedure     LAB:        Pre-op Assessment    I have reviewed the Patient Summary Reports.     I have reviewed the Nursing Notes. I have reviewed the NPO Status.    I have reviewed the Medications.     Review of Systems  Anesthesia Hx:  No problems with previous Anesthesia             Denies Family Hx of Anesthesia complications.    Denies Personal Hx of Anesthesia complications.                    Social:  Former Smoker       Cardiovascular:     Hypertension   CAD               Irregular heart rhythm  SVT                         Pulmonary:        Sleep Apnea                Hepatic/GI:     GERD             Neurological:      Headaches                                 Endocrine:  Diabetes Hypothyroidism          Psych:    depression Panic attacks               Physical Exam  General: Alert and Oriented    Airway:  Mallampati: III   Mouth Opening: Small, but > 3cm  TM Distance: Normal  Tongue: Normal  Neck ROM: Normal ROM    Dental:  Intact    Chest/Lungs:  Normal Respiratory Rate    Heart:  Rate: Normal  Rhythm: Regular Rhythm        Anesthesia Plan  Type of Anesthesia, risks & benefits discussed:    Anesthesia Type: Gen ETT  Intra-op Monitoring Plan: Standard ASA Monitors  Post Op Pain Control Plan: IV/PO Opioids PRN and multimodal analgesia  Induction:  IV  Airway Plan: Direct, Post-Induction  Informed Consent: Informed consent signed with the Patient and all parties understand the risks and agree with anesthesia plan.  All questions answered. Patient consented to blood products? Yes  ASA Score: 3  Day of Surgery Review of History & Physical: H&P Update referred to the surgeon/provider.  Anesthesia Plan Notes: ERAS    Ready For Surgery From Anesthesia Perspective.     .

## 2024-04-22 NOTE — TRANSFER OF CARE
"Anesthesia Transfer of Care Note    Patient: Teagan Aldrich    Procedure(s) Performed: Procedure(s) (LRB):  CREATION, BYPASS, DUODENUM TO JEJUNUM, LAPAROSCOPIC (N/A)    Patient location: PACU    Anesthesia Type: general    Transport from OR: Transported from OR on room air with adequate spontaneous ventilation    Post pain: adequate analgesia    Post assessment: no apparent anesthetic complications    Post vital signs: stable    Level of consciousness: awake, alert and oriented    Nausea/Vomiting: no nausea/vomiting    Complications: none    Transfer of care protocol was followed      Last vitals: Visit Vitals  BP (!) 141/74 (BP Location: Right arm, Patient Position: Lying)   Pulse 85   Temp 36.6 °C (97.9 °F) (Oral)   Resp 20   Ht 5' 7" (1.702 m)   Wt 104 kg (229 lb 4.5 oz)   SpO2 95%   Breastfeeding No   BMI 35.91 kg/m²     "

## 2024-04-22 NOTE — ANESTHESIA PROCEDURE NOTES
Intubation    Date/Time: 4/22/2024 8:32 AM    Performed by: Jaymie Wallace CRNA  Authorized by: Get Dacosta MD    Intubation:     Induction:  Intravenous    Intubated:  Postinduction    Mask Ventilation:  Easy mask    Attempts:  1    Attempted By:  CRNA    Method of Intubation:  Video laryngoscopy    Blade:  Paredes 3    Laryngeal View Grade: Grade I - full view of cords      Difficult Airway Encountered?: No      Complications:  None    Airway Device:  Oral endotracheal tube    Airway Device Size:  7.0    Style/Cuff Inflation:  Cuffed (inflated to minimal occlusive pressure)    Inflation Amount (mL):  6    Tube secured:  22    Secured at:  The teeth    Placement Verified By:  Capnometry    Complicating Factors:  None    Findings Post-Intubation:  BS equal bilateral

## 2024-04-23 PROBLEM — D62 ACUTE BLOOD LOSS ANEMIA: Status: ACTIVE | Noted: 2024-04-23

## 2024-04-23 LAB
ABO + RH BLD: NORMAL
ABO + RH BLD: NORMAL
ANION GAP SERPL CALC-SCNC: 5 MEQ/L
BASOPHILS # BLD AUTO: 0 X10(3)/MCL
BASOPHILS NFR BLD AUTO: 0 %
BLD PROD TYP BPU: NORMAL
BLD PROD TYP BPU: NORMAL
BLOOD UNIT EXPIRATION DATE: NORMAL
BLOOD UNIT EXPIRATION DATE: NORMAL
BLOOD UNIT TYPE CODE: 5100
BLOOD UNIT TYPE CODE: 5100
BUN SERPL-MCNC: 26.1 MG/DL (ref 9.8–20.1)
CALCIUM SERPL-MCNC: 8.3 MG/DL (ref 8.4–10.2)
CHLORIDE SERPL-SCNC: 109 MMOL/L (ref 98–107)
CO2 SERPL-SCNC: 25 MMOL/L (ref 22–29)
CREAT SERPL-MCNC: 0.65 MG/DL (ref 0.55–1.02)
CREAT/UREA NIT SERPL: 40
CROSSMATCH INTERPRETATION: NORMAL
CROSSMATCH INTERPRETATION: NORMAL
DISPENSE STATUS: NORMAL
DISPENSE STATUS: NORMAL
EOSINOPHIL # BLD AUTO: 0 X10(3)/MCL (ref 0–0.9)
EOSINOPHIL NFR BLD AUTO: 0 %
ERYTHROCYTE [DISTWIDTH] IN BLOOD BY AUTOMATED COUNT: 13.5 % (ref 11.5–17)
GFR SERPLBLD CREATININE-BSD FMLA CKD-EPI: >60 MLS/MIN/1.73/M2
GLUCOSE SERPL-MCNC: 172 MG/DL (ref 74–100)
HCT VFR BLD AUTO: 23 % (ref 37–47)
HCT VFR BLD AUTO: 24.8 % (ref 37–47)
HCT VFR BLD AUTO: 25.6 % (ref 37–47)
HGB BLD-MCNC: 7.5 G/DL (ref 12–16)
HGB BLD-MCNC: 8.1 G/DL (ref 12–16)
HGB BLD-MCNC: 8.2 G/DL (ref 12–16)
IMM GRANULOCYTES # BLD AUTO: 0.03 X10(3)/MCL (ref 0–0.04)
IMM GRANULOCYTES NFR BLD AUTO: 0.4 %
LYMPHOCYTES # BLD AUTO: 0.94 X10(3)/MCL (ref 0.6–4.6)
LYMPHOCYTES NFR BLD AUTO: 12.7 %
MCH RBC QN AUTO: 29.3 PG (ref 27–31)
MCHC RBC AUTO-ENTMCNC: 32 G/DL (ref 33–36)
MCV RBC AUTO: 91.4 FL (ref 80–94)
MONOCYTES # BLD AUTO: 0.56 X10(3)/MCL (ref 0.1–1.3)
MONOCYTES NFR BLD AUTO: 7.6 %
NEUTROPHILS # BLD AUTO: 5.86 X10(3)/MCL (ref 2.1–9.2)
NEUTROPHILS NFR BLD AUTO: 79.3 %
NRBC BLD AUTO-RTO: 0 %
PLATELET # BLD AUTO: 252 X10(3)/MCL (ref 130–400)
PMV BLD AUTO: 9.4 FL (ref 7.4–10.4)
POCT GLUCOSE: 114 MG/DL (ref 70–110)
POCT GLUCOSE: 160 MG/DL (ref 70–110)
POCT GLUCOSE: 160 MG/DL (ref 70–110)
POCT GLUCOSE: 173 MG/DL (ref 70–110)
POTASSIUM SERPL-SCNC: 4.6 MMOL/L (ref 3.5–5.1)
PSYCHE PATHOLOGY RESULT: NORMAL
RBC # BLD AUTO: 2.8 X10(6)/MCL (ref 4.2–5.4)
SODIUM SERPL-SCNC: 139 MMOL/L (ref 136–145)
UNIT NUMBER: NORMAL
UNIT NUMBER: NORMAL
WBC # SPEC AUTO: 7.39 X10(3)/MCL (ref 4.5–11.5)

## 2024-04-23 PROCEDURE — 86923 COMPATIBILITY TEST ELECTRIC: CPT | Performed by: NURSE PRACTITIONER

## 2024-04-23 PROCEDURE — P9016 RBC LEUKOCYTES REDUCED: HCPCS | Performed by: NURSE PRACTITIONER

## 2024-04-23 PROCEDURE — 85014 HEMATOCRIT: CPT | Performed by: NURSE PRACTITIONER

## 2024-04-23 PROCEDURE — 25000003 PHARM REV CODE 250: Performed by: NURSE PRACTITIONER

## 2024-04-23 PROCEDURE — 36415 COLL VENOUS BLD VENIPUNCTURE: CPT | Performed by: NURSE PRACTITIONER

## 2024-04-23 PROCEDURE — 30233N1 TRANSFUSION OF NONAUTOLOGOUS RED BLOOD CELLS INTO PERIPHERAL VEIN, PERCUTANEOUS APPROACH: ICD-10-PCS | Performed by: SURGERY

## 2024-04-23 PROCEDURE — 85018 HEMOGLOBIN: CPT | Performed by: NURSE PRACTITIONER

## 2024-04-23 PROCEDURE — 94799 UNLISTED PULMONARY SVC/PX: CPT

## 2024-04-23 PROCEDURE — 11000001 HC ACUTE MED/SURG PRIVATE ROOM

## 2024-04-23 PROCEDURE — 85025 COMPLETE CBC W/AUTO DIFF WBC: CPT | Performed by: NURSE PRACTITIONER

## 2024-04-23 PROCEDURE — 80048 BASIC METABOLIC PNL TOTAL CA: CPT | Performed by: NURSE PRACTITIONER

## 2024-04-23 PROCEDURE — 63600175 PHARM REV CODE 636 W HCPCS: Performed by: NURSE PRACTITIONER

## 2024-04-23 PROCEDURE — 36430 TRANSFUSION BLD/BLD COMPNT: CPT

## 2024-04-23 RX ORDER — HYDROCODONE BITARTRATE AND ACETAMINOPHEN 500; 5 MG/1; MG/1
TABLET ORAL
Status: DISCONTINUED | OUTPATIENT
Start: 2024-04-23 | End: 2024-04-25 | Stop reason: HOSPADM

## 2024-04-23 RX ADMIN — ACETAMINOPHEN 1000 MG: 500 TABLET ORAL at 01:04

## 2024-04-23 RX ADMIN — KETOROLAC TROMETHAMINE 30 MG: 30 INJECTION, SOLUTION INTRAMUSCULAR at 06:04

## 2024-04-23 RX ADMIN — KETOROLAC TROMETHAMINE 30 MG: 30 INJECTION, SOLUTION INTRAMUSCULAR at 05:04

## 2024-04-23 RX ADMIN — DILTIAZEM HYDROCHLORIDE 120 MG: 120 CAPSULE, COATED, EXTENDED RELEASE ORAL at 10:04

## 2024-04-23 RX ADMIN — SOTALOL HYDROCHLORIDE 80 MG: 80 TABLET ORAL at 10:04

## 2024-04-23 RX ADMIN — ACETAMINOPHEN 1000 MG: 500 TABLET ORAL at 10:04

## 2024-04-23 RX ADMIN — LEVOTHYROXINE SODIUM 50 MCG: 50 TABLET ORAL at 06:04

## 2024-04-23 RX ADMIN — FAMOTIDINE 20 MG: 20 TABLET, FILM COATED ORAL at 09:04

## 2024-04-23 RX ADMIN — PANTOPRAZOLE SODIUM 40 MG: 40 TABLET, DELAYED RELEASE ORAL at 09:04

## 2024-04-23 RX ADMIN — KETOROLAC TROMETHAMINE 30 MG: 30 INJECTION, SOLUTION INTRAMUSCULAR at 12:04

## 2024-04-23 RX ADMIN — KETOROLAC TROMETHAMINE 30 MG: 30 INJECTION, SOLUTION INTRAMUSCULAR at 11:04

## 2024-04-23 RX ADMIN — SODIUM CHLORIDE, POTASSIUM CHLORIDE, SODIUM LACTATE AND CALCIUM CHLORIDE: 600; 310; 30; 20 INJECTION, SOLUTION INTRAVENOUS at 06:04

## 2024-04-23 RX ADMIN — SERTRALINE HYDROCHLORIDE 150 MG: 50 TABLET ORAL at 10:04

## 2024-04-23 RX ADMIN — ONDANSETRON 4 MG: 2 INJECTION INTRAMUSCULAR; INTRAVENOUS at 03:04

## 2024-04-23 RX ADMIN — PROCHLORPERAZINE EDISYLATE 5 MG: 5 INJECTION INTRAMUSCULAR; INTRAVENOUS at 04:04

## 2024-04-23 RX ADMIN — INSULIN ASPART 1 UNITS: 100 INJECTION, SOLUTION INTRAVENOUS; SUBCUTANEOUS at 01:04

## 2024-04-23 RX ADMIN — ACETAMINOPHEN 1000 MG: 10 INJECTION, SOLUTION INTRAVENOUS at 07:04

## 2024-04-23 NOTE — PLAN OF CARE
Problem: Adult Inpatient Plan of Care  Goal: Plan of Care Review  Outcome: Progressing  Goal: Patient-Specific Goal (Individualized)  Outcome: Progressing  Goal: Absence of Hospital-Acquired Illness or Injury  Outcome: Progressing  Goal: Optimal Comfort and Wellbeing  Outcome: Progressing  Goal: Readiness for Transition of Care  Outcome: Progressing     Problem: Infection  Goal: Absence of Infection Signs and Symptoms  Outcome: Progressing     Problem: Fall Injury Risk  Goal: Absence of Fall and Fall-Related Injury  Outcome: Progressing     Problem: Pain Acute  Goal: Acceptable Pain Control and Functional Ability  Outcome: Progressing     Problem: Surgical Site Infection  Goal: Absence of Infection Signs and Symptoms  Outcome: Progressing

## 2024-04-23 NOTE — PROGRESS NOTES
Discussed plan of care with surgeon.     Recommends blood transfusion today    Ordered 2 units PRBCs today to give.    ELVIA Schaefer NP

## 2024-04-23 NOTE — PROGRESS NOTES
Visited with patient this morning.   She complained of being thirsty. Advised patient that she was still NPO and that ELVIA Schaefer NP would be speaking with her soon.  Encouraged patient to walk in the hallway every 2 hours. Utilize IS 10 times an hour and begin drinking when she was approved to drink.  Instructed her that we would return tomorrow to review and discuss her post-op protocols.

## 2024-04-23 NOTE — PROGRESS NOTES
Bariatric surgery interim note:    POD# 1     Recheck H/H @ 1130 slight drop  Hgb 7.5 (8.2)  Hct 23.0 (25.6)    Pre op value of H/H 14.3/44.5 was drawn 3.5 weeks ago    Will continue IVF at current rate, continue close monitoring.    If pt becomes acutely symptomatic or hypotension worsens, will order PRBCs.      / 64   P 94  T 98.4    Jeanne MAHAJAN/Dr. Hathaway

## 2024-04-23 NOTE — PROGRESS NOTES
[facility] - 8th Floor Med Surg  General Surgery  Progress Note    Subjective:     Interval History: POD# 1     [facility] - 8th Floor Med Surg  [address] [place] [address]  Phone: [phone]    [facility] Progress Note  POD# 1 S/P Lap Gisela en Y Gastric Bypass  Dr. Malcom Hathaway  Patient Name: Teagan Aldrich  YOB: 1965  Author: Delaney Schaefer, ANP     Date: 04/23/2024      Admit Diagnosis:   Obesity BMI 35.91  Remote history of Nissen fundoplication    POD# 1 S/P Laparoscopic takedown of previous Nissen fundoplication , Laparoscopic Gisela en Y Gastric Bypass per Dr. Malcom Hathaway     [facility] Course:  Ms. Teagan Aldrich is a 58 y.o. female who was admitted for an elective bariatric procedure. Procedure performed as stated above. Upon completion of procedure, pt was transferred from the recovery room to the post surgical floor for further care and treatment. POD#1, afebrile, vital signs stable. The pt's diet was advanced to bariatric clear liquids. Large bloody BM this AM. Patient reports mild weakness but no dizziness or syncope with ambulation.     Review of Systems: Mild incisional pain reported but tolerable, 2/10, mainly to LUQ. Some gas pain in chest and in between shoulder blades. No nausea, vomiting, dysphagia, GERD. Patient ambulating in the room/hallway and voiding without difficulty. Pt denies any dizziness, palpitations, SOB, or CP. All other review of systems are negative.     Physical Examination:   Vital signs: stable, noted in chart. Slight hypotension noted this AM BP 99/66 P 97. Afebrile.   Home meds for BP held this AM.   General: Awake and alert, able to answer all questions. Resting in bed with family member at bedside  Respiratory:  Clear to auscultation bilaterally  Cardio: Regular rate and rhythm.  Abdomen: Soft, non distended. Bowel sounds present to all 4 quadrants. Lap sites clean, dry, and intact.  Appropriate point tenderness to lap sites. Abdomen benign. ZIA drain to left mid abdomen with SS/thin bloody drainage, penrose drain to LUQ intact.   Neuro: Alert and oriented x's 4.    ZIA output x 24 hours : 110 cc     Labs:  Significant drop in H.H from pre op value. Hgb 14.3/ Hct 44.5 (3/28, pre op), this AM dropped to 8.2/25.6     Post-Op Info:  Procedure(s) (LRB):  CREATION, BYPASS, DUODENUM TO JEJUNUM, LAPAROSCOPIC (N/A)   1 Day Post-Op      Medications:  Continuous Infusions:  Current Facility-Administered Medications   Medication Dose Route Frequency Last Rate Last Admin    lactated ringers   Intravenous Continuous 125 mL/hr at 04/22/24 1315 New Bag at 04/22/24 1315    lactated ringers   Intravenous Continuous 125 mL/hr at 04/23/24 0651 New Bag at 04/23/24 0651     Scheduled Meds:  Current Facility-Administered Medications   Medication Dose Route Frequency    acetaminophen  1,000 mg Oral Q8H    diltiaZEM  120 mg Oral TID    famotidine  20 mg Oral Daily    heparin (porcine)  5,000 Units Subcutaneous Daily    isosorbide dinitrate  5 mg Oral BID    ketorolac  30 mg Intravenous Q6H    levothyroxine  50 mcg Oral Before breakfast    pantoprazole  40 mg Oral Daily    sertraline  150 mg Oral QHS    sotaloL  80 mg Oral BID     PRN Meds:  Current Facility-Administered Medications:     ALPRAZolam, 0.5 mg, Oral, BID PRN    cloNIDine 0.1 mg/24 hr td ptwk, 1 patch, Transdermal, Once PRN    dextrose 10%, 12.5 g, Intravenous, PRN    dextrose 10%, 25 g, Intravenous, PRN    glucagon (human recombinant), 1 mg, Intramuscular, PRN    glucose, 16 g, Oral, PRN    glucose, 24 g, Oral, PRN    hydrALAZINE, 10 mg, Intravenous, Q6H PRN    hyoscyamine, 0.125 mg, Sublingual, Q4H PRN    insulin aspart U-100, 1-10 Units, Subcutaneous, QID (AC + HS) PRN    labetalol, 10 mg, Intravenous, Q2H PRN    ondansetron, 4 mg, Oral, Q6H PRN    ondansetron, 4 mg, Intravenous, Q6H PRN    prochlorperazine, 5 mg, Intravenous, Q6H PRN    promethazine, 25  mg, Oral, Q6H PRN    traMADoL, 100 mg, Oral, Q6H PRN     Objective:     Vital Signs (Most Recent):  Temp: 98.4 °F (36.9 °C) (04/23/24 0745)  Pulse: 97 (04/23/24 0745)  Resp: 16 (04/23/24 0745)  BP: 99/66 (04/23/24 0745)  SpO2: 98 % (04/23/24 0745) Vital Signs (24h Range):  Temp:  [97.9 °F (36.6 °C)-99.5 °F (37.5 °C)] 98.4 °F (36.9 °C)  Pulse:  [] 97  Resp:  [11-20] 16  SpO2:  [88 %-98 %] 98 %  BP: ()/(57-83) 99/66       Intake/Output Summary (Last 24 hours) at 4/23/2024 1024  Last data filed at 4/23/2024 0600  Gross per 24 hour   Intake 300 ml   Output 560 ml   Net -260 ml         Significant Labs:  BMP:   Recent Labs   Lab 04/23/24  0422      K 4.6   CO2 25   BUN 26.1*   CREATININE 0.65   CALCIUM 8.3*     CBC:   Recent Labs   Lab 04/23/24  0422   WBC 7.39   RBC 2.80*   HGB 8.2*   HCT 25.6*      MCV 91.4   MCH 29.3   MCHC 32.0*       Significant Diagnostics:  I have reviewed all pertinent imaging results/findings within the past 24 hours.    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     - Continue bariatric clears today, increase PO intake as tolerated.    - Start wound care to penrose drain site and ZIA drain site today.     - Held AM dose of Heparin. Large bloody BM this AM likely from anastomosis ooze from surgery. Follow H/H closely with serial labs. Held BP meds.    - Recheck H.H @ 1100 today.     - Acute blood loss anemia and soft drop in BP. May require blood replacement if continues to drift down. Very mildly symptomatic at this time.     - Continue ambulation in gonzalez  - Continue IS  - Continue IV  fluids  - Resume home meds as appropriate  - Keep additional night for observation, consider discharge tomorrow if patient able to tanner PO and remains hemodynamically stable.     Delaney Schaefer, KARLOS  General Surgery  [facility] - 8th Floor Med Surg

## 2024-04-24 LAB
ABO + RH BLD: NORMAL
ABO + RH BLD: NORMAL
ANION GAP SERPL CALC-SCNC: 5 MEQ/L
BASOPHILS # BLD AUTO: 0.03 X10(3)/MCL
BASOPHILS NFR BLD AUTO: 0.6 %
BLD PROD TYP BPU: NORMAL
BLD PROD TYP BPU: NORMAL
BLOOD UNIT EXPIRATION DATE: NORMAL
BLOOD UNIT EXPIRATION DATE: NORMAL
BLOOD UNIT TYPE CODE: 5100
BLOOD UNIT TYPE CODE: 5100
BUN SERPL-MCNC: 20.2 MG/DL (ref 9.8–20.1)
CALCIUM SERPL-MCNC: 8.2 MG/DL (ref 8.4–10.2)
CHLORIDE SERPL-SCNC: 111 MMOL/L (ref 98–107)
CO2 SERPL-SCNC: 24 MMOL/L (ref 22–29)
CREAT SERPL-MCNC: 0.58 MG/DL (ref 0.55–1.02)
CREAT/UREA NIT SERPL: 35
CROSSMATCH INTERPRETATION: NORMAL
CROSSMATCH INTERPRETATION: NORMAL
DISPENSE STATUS: NORMAL
DISPENSE STATUS: NORMAL
EOSINOPHIL # BLD AUTO: 0.11 X10(3)/MCL (ref 0–0.9)
EOSINOPHIL NFR BLD AUTO: 2 %
ERYTHROCYTE [DISTWIDTH] IN BLOOD BY AUTOMATED COUNT: 14.2 % (ref 11.5–17)
GFR SERPLBLD CREATININE-BSD FMLA CKD-EPI: >60 MLS/MIN/1.73/M2
GLUCOSE SERPL-MCNC: 133 MG/DL (ref 74–100)
HCT VFR BLD AUTO: 25.3 % (ref 37–47)
HGB BLD-MCNC: 8 G/DL (ref 12–16)
IMM GRANULOCYTES # BLD AUTO: 0.01 X10(3)/MCL (ref 0–0.04)
IMM GRANULOCYTES NFR BLD AUTO: 0.2 %
LYMPHOCYTES # BLD AUTO: 1.63 X10(3)/MCL (ref 0.6–4.6)
LYMPHOCYTES NFR BLD AUTO: 30.1 %
MCH RBC QN AUTO: 28.6 PG (ref 27–31)
MCHC RBC AUTO-ENTMCNC: 31.6 G/DL (ref 33–36)
MCV RBC AUTO: 90.4 FL (ref 80–94)
MONOCYTES # BLD AUTO: 0.28 X10(3)/MCL (ref 0.1–1.3)
MONOCYTES NFR BLD AUTO: 5.2 %
NEUTROPHILS # BLD AUTO: 3.35 X10(3)/MCL (ref 2.1–9.2)
NEUTROPHILS NFR BLD AUTO: 61.9 %
NRBC BLD AUTO-RTO: 0 %
PLATELET # BLD AUTO: 205 X10(3)/MCL (ref 130–400)
PMV BLD AUTO: 9.3 FL (ref 7.4–10.4)
POCT GLUCOSE: 141 MG/DL (ref 70–110)
POTASSIUM SERPL-SCNC: 4 MMOL/L (ref 3.5–5.1)
RBC # BLD AUTO: 2.8 X10(6)/MCL (ref 4.2–5.4)
SODIUM SERPL-SCNC: 140 MMOL/L (ref 136–145)
UNIT NUMBER: NORMAL
UNIT NUMBER: NORMAL
WBC # SPEC AUTO: 5.41 X10(3)/MCL (ref 4.5–11.5)

## 2024-04-24 PROCEDURE — P9016 RBC LEUKOCYTES REDUCED: HCPCS | Performed by: SURGERY

## 2024-04-24 PROCEDURE — 63600175 PHARM REV CODE 636 W HCPCS: Performed by: NURSE PRACTITIONER

## 2024-04-24 PROCEDURE — 99900031 HC PATIENT EDUCATION (STAT)

## 2024-04-24 PROCEDURE — 99900035 HC TECH TIME PER 15 MIN (STAT)

## 2024-04-24 PROCEDURE — 80048 BASIC METABOLIC PNL TOTAL CA: CPT | Performed by: NURSE PRACTITIONER

## 2024-04-24 PROCEDURE — 85025 COMPLETE CBC W/AUTO DIFF WBC: CPT | Performed by: NURSE PRACTITIONER

## 2024-04-24 PROCEDURE — 25000003 PHARM REV CODE 250: Performed by: NURSE PRACTITIONER

## 2024-04-24 PROCEDURE — 86923 COMPATIBILITY TEST ELECTRIC: CPT | Mod: 91 | Performed by: SURGERY

## 2024-04-24 PROCEDURE — 36415 COLL VENOUS BLD VENIPUNCTURE: CPT | Performed by: NURSE PRACTITIONER

## 2024-04-24 PROCEDURE — 94799 UNLISTED PULMONARY SVC/PX: CPT

## 2024-04-24 PROCEDURE — 36430 TRANSFUSION BLD/BLD COMPNT: CPT

## 2024-04-24 PROCEDURE — 11000001 HC ACUTE MED/SURG PRIVATE ROOM

## 2024-04-24 RX ORDER — HYDROCODONE BITARTRATE AND ACETAMINOPHEN 500; 5 MG/1; MG/1
TABLET ORAL
Status: DISCONTINUED | OUTPATIENT
Start: 2024-04-24 | End: 2024-04-25 | Stop reason: HOSPADM

## 2024-04-24 RX ADMIN — ACETAMINOPHEN 1000 MG: 500 TABLET ORAL at 01:04

## 2024-04-24 RX ADMIN — DILTIAZEM HYDROCHLORIDE 120 MG: 120 CAPSULE, COATED, EXTENDED RELEASE ORAL at 08:04

## 2024-04-24 RX ADMIN — TRAMADOL HYDROCHLORIDE 100 MG: 50 TABLET, COATED ORAL at 08:04

## 2024-04-24 RX ADMIN — FAMOTIDINE 20 MG: 20 TABLET, FILM COATED ORAL at 09:04

## 2024-04-24 RX ADMIN — SERTRALINE HYDROCHLORIDE 150 MG: 50 TABLET ORAL at 08:04

## 2024-04-24 RX ADMIN — SOTALOL HYDROCHLORIDE 80 MG: 80 TABLET ORAL at 08:04

## 2024-04-24 RX ADMIN — SODIUM CHLORIDE, POTASSIUM CHLORIDE, SODIUM LACTATE AND CALCIUM CHLORIDE: 600; 310; 30; 20 INJECTION, SOLUTION INTRAVENOUS at 12:04

## 2024-04-24 RX ADMIN — TRAMADOL HYDROCHLORIDE 100 MG: 50 TABLET, COATED ORAL at 04:04

## 2024-04-24 RX ADMIN — KETOROLAC TROMETHAMINE 30 MG: 30 INJECTION, SOLUTION INTRAMUSCULAR at 12:04

## 2024-04-24 RX ADMIN — ACETAMINOPHEN 1000 MG: 500 TABLET ORAL at 10:04

## 2024-04-24 RX ADMIN — LEVOTHYROXINE SODIUM 50 MCG: 50 TABLET ORAL at 06:04

## 2024-04-24 RX ADMIN — PANTOPRAZOLE SODIUM 40 MG: 40 TABLET, DELAYED RELEASE ORAL at 09:04

## 2024-04-24 NOTE — PROGRESS NOTES
[facility] - 8th Floor Med Surg  [address]  [place] [address]  Phone: [phone]    Bariatric Hospital Progress Note  POD# 2 S/P Lap Gisela en Y Gastric Bypass (revision)  Dr. Malcom Hathaway  Patient Name: Teagan Aldrich  YOB: 1965  Author: Delaney Schaefer, ANP     Date: 04/24/2024      Admit Diagnosis:   Obesity BMI 35.91  Remote history of Nissen fundoplication     POD# 2 S/P Laparoscopic takedown of previous Nissen fundoplication , Laparoscopic Gisela en Y Gastric Bypass per Dr. Malcom Hathaway     [facility] Course:  Ms. Teagan Aldrich is a 58 y.o. female who was admitted for an elective bariatric procedure. Procedure performed as stated above. Upon completion of procedure, pt was transferred from the recovery room to the post surgical floor for further care and treatment.     - POD#1, afebrile. The pt's diet was advanced to bariatric clear liquids. She had large bloody BM and drop in H/H with mild hypotension. Heparin held. She was given 2 units PRBCs. Serial H/H was ordered.     - POD# 2 , H/H essentially unchanged from post-transfusion last night @ 2300.  H/H this Am @ 0400 was 8/25.3. Ordered 2 additional units of PRBCs (currently running at time of rounds). Toradol held.  Patient had additional 4 bloody Bms over the past 24 hrs but hasn't had one since this AM. Still passing flatus and voiding well. Patient is tolerating PO well. Pt reports abdominal discomfort has improved greatly compared to POD# 1 and resolves with ambulation. Walking in hallway without weakness or dizziness.     Review of Systems: Mild incisional pain reported but tolerable. No nausea, vomiting, dysphagia, GERD. Patient ambulating in the room/hallway and voiding without difficulty. Pt denies any dizziness, palpitations, SOB, or CP. All other review of systems are negative.     Physical Examination:   Vital signs: stable, noted in chart  General: Awake and alert, able to answer all  questions. Resting in bed with family member at bedside.   Respiratory:  Clear to auscultation bilaterally  Cardio: Regular rate and rhythm.  Abdomen: Soft, non distended. Bowel sounds present to all 4 quadrants. Lap sites clean, dry, and intact. Appropriate point tenderness to lap sites. Abdomen benign. ZIA drain to left mid abdomen with SS/thin bloody drainage, penrose drain to LUQ intact.   Neuro: Alert and oriented x's 4.    ZIA output x 24 hours : 20 cc    Labs:  H/H low but stable 8/25.3 this AM @ 0400. WBC normal @ 5K. BUN improved overnight from 26 to 20. Will plan recheck H/H tomorrow AM.     Impression and Plan:     Pt POD#2 status post Lap RNY GBP, takedown previous Nissen is hemodynamically stable at present.     - Currently receiving 4th unit of PRBC since admit.   - In good spirits and tolerating liquids. Pain well controlled.   - Bloody bowel movements have seemed to slow and she is passing flatus. Likely from ooze at anastomosis.   - Recheck labs in Am and if remains stable, plan for DC in AM  - Dr. Hathaway examined patient, discussed recommendations, and answered all questions.     Delaney Schaefer, ANP

## 2024-04-24 NOTE — PLAN OF CARE
Problem: Adult Inpatient Plan of Care  Goal: Plan of Care Review  4/24/2024 0506 by Angelique Barrera RN  Outcome: Progressing  4/24/2024 0338 by Angelique Barrera RN  Outcome: Progressing  4/24/2024 0305 by Angelique Barrera RN  Outcome: Progressing  Goal: Patient-Specific Goal (Individualized)  4/24/2024 0506 by Angelique Barrera RN  Outcome: Progressing  4/24/2024 0338 by Angelique Barrera RN  Outcome: Progressing  4/24/2024 0305 by Angelique Barrera RN  Outcome: Progressing  Goal: Absence of Hospital-Acquired Illness or Injury  4/24/2024 0506 by Angelique Barrera RN  Outcome: Progressing  4/24/2024 0338 by Angelique Barrera RN  Outcome: Progressing  4/24/2024 0305 by Angelique Barrera RN  Outcome: Progressing  Goal: Optimal Comfort and Wellbeing  4/24/2024 0506 by Angelique Barrera RN  Outcome: Progressing  4/24/2024 0338 by Angelique Barrera RN  Outcome: Progressing  4/24/2024 0305 by Angelique Barrera RN  Outcome: Progressing  Goal: Readiness for Transition of Care  4/24/2024 0506 by Angelique Barrera RN  Outcome: Progressing  4/24/2024 0338 by Angelique Barrera RN  Outcome: Progressing  4/24/2024 0305 by Angelique Barrera RN  Outcome: Progressing     Problem: Adult Inpatient Plan of Care  Goal: Patient-Specific Goal (Individualized)  4/24/2024 0506 by Angelique Barrera RN  Outcome: Progressing  4/24/2024 0338 by Angelique Barrera RN  Outcome: Progressing  4/24/2024 0305 by Angelique Barrera RN  Outcome: Progressing     Problem: Adult Inpatient Plan of Care  Goal: Optimal Comfort and Wellbeing  4/24/2024 0506 by Angelique Barrera RN  Outcome: Progressing  4/24/2024 0338 by Angelique Barrera RN  Outcome: Progressing  4/24/2024 0305 by Angelique Barrera RN  Outcome: Progressing     Problem: Adult Inpatient Plan of Care  Goal: Readiness for Transition of Care  4/24/2024 0506 by Angelique Barrera RN  Outcome: Progressing  4/24/2024 0338 by Angelique Barrera RN  Outcome: Progressing  4/24/2024 0305 by Angelique Barrera RN  Outcome: Progressing     Problem:  Infection  Goal: Absence of Infection Signs and Symptoms  4/24/2024 0506 by Angelique Barrera RN  Outcome: Progressing  4/24/2024 0338 by Angelique Barrera, RN  Outcome: Progressing  4/24/2024 0305 by Angelique Barrera RN  Outcome: Progressing     Problem: Infection  Goal: Absence of Infection Signs and Symptoms  4/24/2024 0506 by Angelique Barrera, RN  Outcome: Progressing  4/24/2024 0338 by Angelique Barrera, RN  Outcome: Progressing  4/24/2024 0305 by Angelique Barrera RN  Outcome: Progressing     Problem: Fall Injury Risk  Goal: Absence of Fall and Fall-Related Injury  4/24/2024 0506 by Angelique Barrera, RN  Outcome: Progressing  4/24/2024 0338 by Angelique Barrera RN  Outcome: Progressing  4/24/2024 0305 by Angelique Barrera RN  Outcome: Progressing     Problem: Pain Acute  Goal: Acceptable Pain Control and Functional Ability  4/24/2024 0506 by Angelique Barrera RN  Outcome: Progressing  4/24/2024 0338 by Angelique Barrera RN  Outcome: Progressing  4/24/2024 0305 by Angelique Barrera RN  Outcome: Progressing     Problem: Wound  Goal: Optimal Coping  4/24/2024 0506 by Angelique Barrera, RN  Outcome: Progressing  4/24/2024 0338 by Angelique Barrera, RN  Outcome: Progressing  4/24/2024 0305 by Angelique Barrera, RN  Outcome: Progressing  Goal: Optimal Functional Ability  4/24/2024 0506 by Angelique Barrera, RN  Outcome: Progressing  4/24/2024 0338 by Angelique Barrera RN  Outcome: Progressing  4/24/2024 0305 by Angelique Barrera RN  Outcome: Progressing  Goal: Absence of Infection Signs and Symptoms  4/24/2024 0506 by Angelique Barrera, RN  Outcome: Progressing  4/24/2024 0338 by Angelique Barrera, RN  Outcome: Progressing  4/24/2024 0305 by Angelique Barrera RN  Outcome: Progressing  Goal: Improved Oral Intake  4/24/2024 0506 by Angelique Barrera RN  Outcome: Progressing  4/24/2024 0338 by Angelique Barrera RN  Outcome: Progressing  4/24/2024 0305 by Angelique Barrera RN  Outcome: Progressing  Goal: Optimal Pain Control and Function  4/24/2024 0506 by Angelique Barrera,  RN  Outcome: Progressing  4/24/2024 0338 by Angelique Barrera RN  Outcome: Progressing  4/24/2024 0305 by Angelique Barrera RN  Outcome: Progressing  Goal: Skin Health and Integrity  4/24/2024 0506 by Angelique Barrera RN  Outcome: Progressing  4/24/2024 0338 by Angelique Barrera RN  Outcome: Progressing  4/24/2024 0305 by Angelique Barrera RN  Outcome: Progressing  Goal: Optimal Wound Healing  4/24/2024 0506 by Angelique Barrera RN  Outcome: Progressing  4/24/2024 0338 by Angelique Barrera RN  Outcome: Progressing  4/24/2024 0305 by Angelique Barrera RN  Outcome: Progressing     Problem: Wound  Goal: Optimal Functional Ability  4/24/2024 0506 by Angelique Barrera RN  Outcome: Progressing  4/24/2024 0338 by Angelique Barrera RN  Outcome: Progressing  4/24/2024 0305 by Angelique Barrera RN  Outcome: Progressing

## 2024-04-24 NOTE — PLAN OF CARE
Problem: Adult Inpatient Plan of Care  Goal: Plan of Care Review  Outcome: Progressing  Goal: Patient-Specific Goal (Individualized)  Outcome: Progressing  Goal: Absence of Hospital-Acquired Illness or Injury  Outcome: Progressing  Goal: Optimal Comfort and Wellbeing  Outcome: Progressing  Goal: Readiness for Transition of Care  Outcome: Progressing     Problem: Infection  Goal: Absence of Infection Signs and Symptoms  Outcome: Progressing     Problem: Fall Injury Risk  Goal: Absence of Fall and Fall-Related Injury  Outcome: Progressing     Problem: Pain Acute  Goal: Acceptable Pain Control and Functional Ability  Outcome: Progressing     Problem: Surgical Site Infection  Goal: Absence of Infection Signs and Symptoms  Outcome: Progressing     Problem: Wound  Goal: Optimal Coping  Outcome: Progressing  Goal: Optimal Functional Ability  Outcome: Progressing  Goal: Absence of Infection Signs and Symptoms  Outcome: Progressing  Goal: Improved Oral Intake  Outcome: Progressing  Goal: Optimal Pain Control and Function  Outcome: Progressing  Goal: Skin Health and Integrity  Outcome: Progressing  Goal: Optimal Wound Healing  Outcome: Progressing

## 2024-04-24 NOTE — DISCHARGE INSTRUCTIONS
HOSPITAL DISCHARGE INSTRUCTIONS    Clinic Phone Numbers       Surgeons office number and after hours on call surgeon: [phone].    ALWAYS call the surgeons office PRIOR to going to an Urgent Care or the emergency room. If medical emergency, call 911.     Signs and Symptoms that would warrant a phone call of the office (regardless of the time of day):     Fever greater than 101 F     Uncontrolled pain that does not improve with pain medication     Uncontrolled nausea that does not improve with nausea medication      Vomiting     Shortness of breath     Chest Pain     Foul smelling drainage from incision and/or yellow or green drainage from incision     Red, hot painful incisions     Bloody bowel movements     ** If you feel as though it is a life-threatening emergency, call 911 and go to the emergency room**          Prescriptions     Medications     Pain medication (if needed) Tylenol over the counter is safe to take for discomfort     Anti-nausea medication (if needed)     Proton Pump Inhibitor (finish all 30 days), call [phone] if you did not receive 30 days of medication       Supplements     Chewable multivitamin- take 2 times a day (unless otherwise directed)     Chewable Calcium Citrate with D3- take 3 times a day (unless otherwise directed)     Iron tablet- take once daily      MiraLAX- take once daily for 2 weeks       Home Medications     Please review your medication list that you received at pre-op class as well as at the hospital instructions upon discharge to assure you are resuming all medications that are deemed  safe after surgery.      ** REMINDER- You should have scheduled your follow-up with your prescribing doctor for 1-week post-op**          Appointments     Surgeons Post-op Visits- please review orange sheet you received in the mail after surgery. Call [phone] if you need to reschedule your surgeons post-op visit.      Bariatric Team Post-op Visits- please review blue sheet  you received in your e-mail or please reference [facility] for your post-op appointments. . Call [phone] option 1 if you need to reschedule your bariatric team post-op visit.          Nutritional Considerations     Hydration is CRITICAL!     Daily fluid intake of  oz water     Water is more important than protein      Review list of allowable and non-allowable liquids in your Bariatric Booklet    The only fluids that count towards your water goal is water, sugar free, caffeine free flavored water        Diet progression          Continue the dietary protocol until you meet with the dietitian at your 2-week visit     Strive to reach protein goal. Only liquids counted toward your protein goal are protein shake, milk and approved yogurt     It is MANDATORY that you do not progress your diet without speaking to the dietitian to prevent potential complications          Incisional Care     Wash your hands before you touch your incisions or dressings     Remove any gauze or dressing over your incision. ONLY steri-strips (butter-fly band aids) should remain over your incision     Shower daily with an anti-bacterial soap (Hibiclens or Dial, orange bar).      Allow water to hit your back in the shower     Wet the incisions with water     Apply soap to a clean washcloth and wash over your incisions (do not scrub)     Rinse your incision with water and pat dry with a clean towel      Check your incisions daily for any redness, swelling, hot to touch, or bright red, green or yellow drainage.             Dark red, dried blood, indention of an incision, bruising may appear under the steri-strips. This is normal.     Do not apply any creams, ointments, etc. on the incisions.      Leave incision open to air (unless instructed otherwise)          Activity     Walk and/or ride a stationary bike 20 minutes a day     Do not lift, pull or push anything greater than 10 pounds for 4-6 weeks     Do not go the gym until you are  4-6 weeks post-op     Use your incentive spirometer (breathing machine) 10 x an hour for 1-2 weeks     Shower daily, DO NOT submerge yourself in water until 2 weeks post-op and cleared by surgeon          Post-Operative Expectations     A soreness is to be expected. Pain differs for everyone. Please refer to the pain scale and list of when to call the doctor regarding pain.     Nausea can last a few weeks after surgery due to the body getting adjusted to the new small stomach. Take anti-nausea as needed and stay HYDRATED. Slight dehydration will cause nausea.     Constipation is common after surgery. Take MiraLAX daily even if your bowel movements are regular. Please refer to the FAQs regarding constipation. Water is essential in preventing constipation.        Constipation after Bariatric Surgery  The Basics     ***Due to the change in your diet just prior to surgery and immediately after surgery, it is very common to have a change in your bowel movements in the immediate post op period. It is completely normal to not have a bowel movement everyday in the first few weeks after bariatric surgery due to decreased oral intake. It is common not to have your first bowel movement for 4-5 days after surgery. If you don't have your first bowel movement 7 days after surgery, please contact surgeon's office to discuss.     What is constipation? -- Constipation is a common problem that makes it hard to have bowel movements. Your bowel movements might be:  Too hard  Too small  Hard to get out  Happening fewer than 3 times a week  What causes constipation after bariatric surgery? -- Constipation can be caused by:  High protein diet and decrease in water intake after bariatric surgery  What other symptoms should I watch for? -- These symptoms could signal a more serious problem:  Blood in the toilet or on the toilet paper after having a bowel movement  Fever  Feeling weak  It could also be a sign of a problem if you have new  constipation without a change in your medicines or diet, and have never had constipation in the past.   Is there anything I can do on my own to get rid of constipation? -- Yes. Try these steps:  Begin your MiraLAX the first day at home and continue it everyday if you are having normal soft bowel movement, even after the two weeks.   Please call the office [phone] if you do not have a bowel movement within 5 days of surgery.   Okay to take over the counter stool softeners once to twice per day, in addition to daily Miralax, if needed to help with post op constipation.   If you begin to have mutliple loose bowel movements (more than 3 per day), discontinue the stool softeners and Miralax.   If you continue to have multiple loose bowel movements per day (more than 5 loose bowel movements per day for more than 2 days in a row) after you have quit taking stool softeners and Miralax, contact surgeon's office to discuss this.  [phone]

## 2024-04-24 NOTE — CONSULTS
"  [facility] - 8th Floor Med Surg  Adult Nutrition  Consult Note    SUMMARY     Recommendations    Recommendation/Intervention: Pt to continue nturitional monitoring and education in bariatric clinic per protocol.  Goals: Understanding of dietary guidelines and tolerance to diet.  Nutrition Goal Status: goal met    Assessment and Plan    No new Assessment & Plan notes have been filed under this hospital service since the last note was generated.  Service: Nutrition       Malnutrition Assessment                                       Reason for Assessment    Reason For Assessment: consult  Diagnosis: other (see comments) (s/p RNY)  Relevant Medical History: Obesity  Interdisciplinary Rounds: attended  General Information Comments: Pt reports tolerating clear liquids. Will return prior to discharge to review home diet protocol.  Nutrition Discharge Planning: Pt to remain on bariatric clears through discharge.    Nutrition Risk Screen    Nutrition Risk Screen: no indicators present    Nutrition/Diet History    Spiritual, Cultural Beliefs, Jain Practices, Values that Affect Care: no  Factors Affecting Nutritional Intake: clear liquid diet, decreased appetite, early satiety (as expected)    Anthropometrics    Temp: 97.7 °F (36.5 °C)  Height Method: Stated  Height: 5' 7" (170.2 cm)  Height (inches): 67 in  Weight Method: Standard Scale  Weight: 104 kg (229 lb 4.5 oz)  Weight (lb): 229.28 lb  Ideal Body Weight (IBW), Female: 135 lb  % Ideal Body Weight, Female (lb): 169.84 %  BMI (Calculated): 35.9  BMI Grade: 35 - 39.9 - obesity - grade II  Usual Body Weight (UBW), k kg  % Usual Body Weight: 100.21  % Weight Change From Usual Weight: 0 %       Lab/Procedures/Meds         Physical Findings/Assessment         Estimated/Assessed Needs    Weight Used For Calorie Calculations: 77 kg (169 lb 12.1 oz)  Energy Calorie Requirements (kcal): 7301-3323 kcals/d  Energy Need Method: Kcal/kg  Protein " Requirements: 77-84g/d  Weight Used For Protein Calculations: 77 kg (169 lb 12.1 oz)  Fluid Requirements (mL): 2080 ml/d  Estimated Fluid Requirement Method: other (see comments) (20 ml/kg actual BW/d)  RDA Method (mL): 1540         Nutrition Prescription Ordered    Current Diet Order: Bariatric clear liquids  Nutrition Order Comments: Pt to remain on bariatric clears through discharge.    Evaluation of Received Nutrient/Fluid Intake    Energy Calories Required: not meeting needs (as expected)  Protein Required: not meeting needs (as expected)  Fluid Required: meeting needs  Tolerance: tolerating  % Intake of Estimated Energy Needs: 0 - 25 %  % Meal Intake: 0 - 25 %    Nutrition Risk    Level of Risk/Frequency of Follow-up: low       Monitor and Evaluation    Food and Nutrient Intake: energy intake, food and beverage intake  Food and Nutrient Adminstration: diet order  Knowledge/Beliefs/Attitudes: food and nutrition knowledge/skill  Anthropometric Measurements: weight, weight change, body mass index       Nutrition Follow-Up    RD Follow-up?:  (2w bariatric clinic follow up)

## 2024-04-24 NOTE — PROGRESS NOTES
Visited with patient this morning.  Reviewed the importance of ambulating in the hallway every 2 hours as approved by surgeon.  Encouraged IS usage 10 times an hour and water intake.  Instructed patient that we would review her discharge protocol tomorrow.

## 2024-04-25 VITALS
HEART RATE: 69 BPM | BODY MASS INDEX: 35.98 KG/M2 | OXYGEN SATURATION: 96 % | RESPIRATION RATE: 18 BRPM | WEIGHT: 229.25 LBS | TEMPERATURE: 97 F | HEIGHT: 67 IN | SYSTOLIC BLOOD PRESSURE: 129 MMHG | DIASTOLIC BLOOD PRESSURE: 79 MMHG

## 2024-04-25 PROBLEM — D62 ACUTE BLOOD LOSS ANEMIA: Status: RESOLVED | Noted: 2024-04-23 | Resolved: 2024-04-25

## 2024-04-25 LAB
HCT VFR BLD AUTO: 31.4 % (ref 37–47)
HGB BLD-MCNC: 10.3 G/DL (ref 12–16)
POCT GLUCOSE: 104 MG/DL (ref 70–110)
POCT GLUCOSE: 124 MG/DL (ref 70–110)

## 2024-04-25 PROCEDURE — 99900035 HC TECH TIME PER 15 MIN (STAT)

## 2024-04-25 PROCEDURE — 25000003 PHARM REV CODE 250: Performed by: NURSE PRACTITIONER

## 2024-04-25 PROCEDURE — 63600175 PHARM REV CODE 636 W HCPCS: Performed by: NURSE PRACTITIONER

## 2024-04-25 PROCEDURE — 94760 N-INVAS EAR/PLS OXIMETRY 1: CPT

## 2024-04-25 PROCEDURE — 99900031 HC PATIENT EDUCATION (STAT)

## 2024-04-25 PROCEDURE — 36415 COLL VENOUS BLD VENIPUNCTURE: CPT | Performed by: NURSE PRACTITIONER

## 2024-04-25 PROCEDURE — 94799 UNLISTED PULMONARY SVC/PX: CPT

## 2024-04-25 PROCEDURE — 85014 HEMATOCRIT: CPT | Performed by: NURSE PRACTITIONER

## 2024-04-25 RX ORDER — ASPIRIN 81 MG/1
81 TABLET ORAL DAILY
Start: 2024-04-25 | End: 2025-04-25

## 2024-04-25 RX ORDER — PROMETHAZINE HYDROCHLORIDE 12.5 MG/1
12.5 TABLET ORAL EVERY 6 HOURS PRN
Qty: 20 TABLET | Refills: 0 | Status: SHIPPED | OUTPATIENT
Start: 2024-04-25 | End: 2024-06-17

## 2024-04-25 RX ORDER — ACETAMINOPHEN 500 MG
1000 TABLET ORAL EVERY 8 HOURS
Qty: 18 TABLET | Refills: 0 | Status: SHIPPED | OUTPATIENT
Start: 2024-04-25 | End: 2024-04-28

## 2024-04-25 RX ORDER — ESOMEPRAZOLE MAGNESIUM 40 MG/1
40 CAPSULE, DELAYED RELEASE ORAL
Start: 2024-04-25

## 2024-04-25 RX ORDER — PANTOPRAZOLE SODIUM 40 MG/1
40 TABLET, DELAYED RELEASE ORAL DAILY
Qty: 30 TABLET | Refills: 0 | Status: SHIPPED | OUTPATIENT
Start: 2024-04-26 | End: 2024-05-26

## 2024-04-25 RX ORDER — RIVAROXABAN 2.5 MG/1
2.5 TABLET, FILM COATED ORAL DAILY
Start: 2024-04-25

## 2024-04-25 RX ORDER — ONDANSETRON 4 MG/1
4 TABLET, ORALLY DISINTEGRATING ORAL EVERY 6 HOURS PRN
Qty: 20 TABLET | Refills: 0 | Status: SHIPPED | OUTPATIENT
Start: 2024-04-25 | End: 2024-06-17

## 2024-04-25 RX ADMIN — SODIUM CHLORIDE, POTASSIUM CHLORIDE, SODIUM LACTATE AND CALCIUM CHLORIDE: 600; 310; 30; 20 INJECTION, SOLUTION INTRAVENOUS at 03:04

## 2024-04-25 RX ADMIN — DILTIAZEM HYDROCHLORIDE 120 MG: 120 CAPSULE, COATED, EXTENDED RELEASE ORAL at 09:04

## 2024-04-25 RX ADMIN — TRAMADOL HYDROCHLORIDE 100 MG: 50 TABLET, COATED ORAL at 09:04

## 2024-04-25 RX ADMIN — PANTOPRAZOLE SODIUM 40 MG: 40 TABLET, DELAYED RELEASE ORAL at 09:04

## 2024-04-25 RX ADMIN — LEVOTHYROXINE SODIUM 50 MCG: 50 TABLET ORAL at 05:04

## 2024-04-25 RX ADMIN — ACETAMINOPHEN 1000 MG: 500 TABLET ORAL at 05:04

## 2024-04-25 RX ADMIN — FAMOTIDINE 20 MG: 20 TABLET, FILM COATED ORAL at 09:04

## 2024-04-25 RX ADMIN — SOTALOL HYDROCHLORIDE 80 MG: 80 TABLET ORAL at 09:04

## 2024-04-25 NOTE — DISCHARGE SUMMARY
[facility] - 8th Floor Med Surg  [address]  [place] [address]  Phone: [phone]    Bariatric Surgery Discharge Summary   Laparoscopic Gisela-en-Y Gastric Bypass  Dr. Malcom Hathaway  Patient Name: Teagan Aldrich  YOB: 1965  Author: Malcom Hathaway MD   Date: 04/25/2024      Discharge date : 04/25/2024    Admit Diagnosis:   Morbid Obesity  GERD  Recurrent HH    Discharge Diagnosis:   Morbid Obesity  GERD  Recurrent HH      Operations During Hospitalization: Laparoscopic Gisela-en-Y Gastric Bypass  Date of Surgery: 4/21/24  Surgeon: Dr. Malcom Hathaway     Hospital Course:  Ms. Teagan Aldrich is a 58 y.o. female who was admitted for an elective   procedure. Procedure performed as stated above.   Upon completion of procedure, patient was transferred from the recovery room, then to the post surgical floor for further care and treatment.   POD#1, afebrile, vital signs stable. The patient's diet was advanced to bariatric clear liquids. + decrease in hematocrit, + bloody BM.  Patient transfused a total of 4 units PRBCs.  POD# 2, afebrile, vital signs stable. Patient currently tolerating bariatric clear liquids, is hemodynamically stable. No further Bloody BM s  POD # 3 afebrile, vital signs stable. Patient currently tolerating bariatric clear liquids, is hemodynamically stable. No further Bloody BM s    Review of Systems: Mild incisional pain reported but tolerable. Some gas pain in chest and in between shoulder blades. No nausea, vomiting, dysphagia, GERD. Patient ambulating in the room/hallway and voiding without difficulty. Patient denies any dizziness, palpitations, SOB, or CP. All other review of systems are negative.     Physical Examination:   Vital signs: stable, noted in chart  General: Awake and alert, able to answer all questions. Resting in bed with family member at bedside.  Respiratory:  Clear to auscultation bilaterally  Cardio: Regular rate and  rhythm.  Abdomen: Soft, non distended. Bowel sounds present to all 4 quadrants. Lap sites clean, dry, and intact. Abdomen benign. Lap sites C/D/I. Penrose drain intact to abd wall. ZIA drain with SS drainage.   Neuro: Alert and oriented x's 4.    Labs:  Unremarkable, see chart    Discharge Medications:   Protonix 40 mg daily for first 30 days post op  Zofran 4 mg ODT PO q 6 hrs prn NV (first line medication for post op NV)  Phenergan 12.5 mg tab PO q 6 hrs prn NV (second line medication for post op NV)  Tylenol OTC 1000 mg PO q 8 hrs around the clock for 3 days post op (BSTOP protocol)  Toradol 10 mg tab PO q 6 hrs around the clock for 3 days post op (BSTOP protocol)    Condition: Satisfactory    Disposition:  Discharge to home with family.     - Follow up in two weeks with Dr. Malcom Hathaway in office.  - Remove Penrose drain from abdominal wall today prior to discharge, nursing staff to educate patient/family how to perform wound packing to Penrose drain site prior to discharge with Iodoform gauze. Will plan to have patient pack this wound until closed by secondary intention. Remove ZIA drain (if present) prior to discharge. Cover ZIA drain site with gauze dressing.   - Continue IS at home  - Walk 20min daily   - Continue bariatric clears throughout today and progress dietary protocol as instructed by dietitian tomorrow upon waking up at home  - Monitor CBGs and BP while at home and contact surgeons office or PCP if any issues  - Discharge instructions reviewed with patient and family prior to discharge. Printed handouts provided regarding post op care/diet/medications.   - Discharge medication reconciliation completed.    Malcom Hathaway MD      ** addendum- Toradol not prescribed upon DC due to acute blood loss anemia.   Med rec completed:  - Hold pre op ASA for 30 days post op  - Resume Xarelto pre op med in 1 week after surgery  Delaney Schaefer, ANP

## 2024-04-25 NOTE — PROGRESS NOTES
Date of education: 4/25/24  Pt education type: []Pre op  [x]Post op  Surgery date: 4/22/24  Type of surgery: Gastrojejunostomy     Education was provided on:   [x]Importance of protein and vitamin protocol  [x]Importance of drinking  fl oz/day of non carbonated sugar free non caffeinated beverages  [x]Importance of following dietary protocol  [x]Importance of ambulation postop   [x]Use of incentive spirometer 10 times an hour while awake  [x]Non opiod pain management post op   [x]Discontinuing use of meds containing aspirin, ibuprofen, NSAIDs, post op  [x]Signs and symptoms of immediate and long term complications post-op  [x]Prevention and signs and symptoms of blood clots   [x]Prevention and signs of infection  [x]Reviewed medication regimen  [x]Importance of adhering to behavioral changes  [x]Importance of following exercise protocol      Barriers to learning:  [x]None evident  []Acuity of illness  []Cognitive defects  []Cultural barriers  []Desire/Motivation  []Difficulty concentrating  []Emotional state  []Financial concerns  []Hearing deficit  []Language barrier  []Literacy  []Memory problems  []Vision impairment     Teaching methods:  []Demonstration  [x]Explanation  [x]Printed materials  [x]Teach back  []Virtual/web based    Expected Compliance:  [x]Good  []Fair  []Poor    Additional Notes:  Reviewed above protocols with patient. She needed assistance with reciting the post-op dietary and behavioral guidelines. Reiterated the importance of following the post-op guidelines to prevent complications. Also reviewed with patient that she was at a higher risk of a gastric leak due to her surgical history of having a Lap Nissen. Stressed the importance of following the post-op diet progression and not advancing her diet until she spoke to the Bariatric dietitian at her 2 week appointment.

## 2024-04-25 NOTE — PROGRESS NOTES
POD 3  AFVSS  HH stable  No further bloody Bms  Adrienne liquid  ZIA min serous  Wound ok      DC home

## 2024-04-25 NOTE — PROGRESS NOTES
Visited with pt again this morning prior to upcoming discharge. Reviewed all nutritional guidelines for home discharge with pt. Pt expressed understanding. Will follow up with scheduled visit in 2w.

## 2024-04-25 NOTE — PROGRESS NOTES
Pt was given discharge instructions provided by Dr Hathaway, home medications, and follow up appointments. I removed her penrose and ZIA drain, and provided teaching about packing and cleansing the penrose site and care for the steristrips and ZIA site. She was discharged to home in stable condition with all questions answered.

## 2024-04-26 ENCOUNTER — PATIENT MESSAGE (OUTPATIENT)
Dept: ADMINISTRATIVE | Facility: OTHER | Age: 59
End: 2024-04-26
Payer: MEDICARE

## 2024-04-26 ENCOUNTER — PATIENT OUTREACH (OUTPATIENT)
Dept: ADMINISTRATIVE | Facility: CLINIC | Age: 59
End: 2024-04-26
Payer: MEDICARE

## 2024-04-27 ENCOUNTER — PATIENT MESSAGE (OUTPATIENT)
Dept: ADMINISTRATIVE | Facility: OTHER | Age: 59
End: 2024-04-27
Payer: MEDICARE

## 2024-04-28 ENCOUNTER — PATIENT MESSAGE (OUTPATIENT)
Dept: ADMINISTRATIVE | Facility: OTHER | Age: 59
End: 2024-04-28
Payer: MEDICARE

## 2024-04-29 ENCOUNTER — PATIENT MESSAGE (OUTPATIENT)
Dept: ADMINISTRATIVE | Facility: OTHER | Age: 59
End: 2024-04-29
Payer: MEDICARE

## 2024-04-29 ENCOUNTER — TELEPHONE (OUTPATIENT)
Dept: BARIATRICS | Facility: HOSPITAL | Age: 59
End: 2024-04-29
Payer: MEDICARE

## 2024-04-29 NOTE — TELEPHONE ENCOUNTER
Date call was completed: 4/29/24  Date of Surgery: 4/22/24  Surgery type:     GJ    Are you drinking the recommended amount of water (73-100oz) a day? []  Yes     [x]  No  If not, how may ounces of water are you drinking? 50-60oz    Are you drinking the recommended amount of protein a day?(recommendations base on individual goal) [x]  Yes        []  No  If not, how many grams of protein are you drinking?    Are you taking the recommended supplements that were discussed in pre-op class?  [x]  Yes   [] No  Multivitamin [x]  Yes        []  No  Calcium Supplement [x]  Yes        []  No  Iron [x]  Yes        []  No  Miralax [x]  Yes        []  No    Are you walking at least 20 minutes a day for exercise? [x]  Yes   [] No    Have you resumed your home medications that you were instructed to resume? [x]  Yes   [] No    Do you have a follow-up appt scheduled with your family doctor or doctor treating you for you co-morb conditions within the week? [x]  Yes   [] No    Are you taking the Protonix (at night) that was prescribed to you in the hospital? [x]  Yes   [] No    Are you showering daily with an antibacterial soap?  [x]  Yes   [] No    Have you had a bowel movement since surgery? [x]  Yes   [] No      Notes: Reiterated to patient to remain on liquid diet until the dietitian contacted her next week.

## 2024-05-07 ENCOUNTER — CLINICAL SUPPORT (OUTPATIENT)
Dept: BARIATRICS | Facility: HOSPITAL | Age: 59
End: 2024-05-07

## 2024-05-07 ENCOUNTER — OFFICE VISIT (OUTPATIENT)
Dept: SURGERY | Facility: CLINIC | Age: 59
End: 2024-05-07
Payer: MEDICARE

## 2024-05-07 VITALS
DIASTOLIC BLOOD PRESSURE: 66 MMHG | WEIGHT: 217 LBS | BODY MASS INDEX: 34.06 KG/M2 | HEIGHT: 67 IN | HEART RATE: 91 BPM | SYSTOLIC BLOOD PRESSURE: 100 MMHG

## 2024-05-07 VITALS — BODY MASS INDEX: 33.74 KG/M2 | HEIGHT: 67 IN | WEIGHT: 215 LBS

## 2024-05-07 DIAGNOSIS — Z98.0 HISTORY OF BYPASS GASTROJEJUNOSTOMY: ICD-10-CM

## 2024-05-07 DIAGNOSIS — E11.9 TYPE 2 DIABETES MELLITUS WITHOUT COMPLICATION, UNSPECIFIED WHETHER LONG TERM INSULIN USE: ICD-10-CM

## 2024-05-07 DIAGNOSIS — Z91.89 ELECTROLYTE IMBALANCE RISK: ICD-10-CM

## 2024-05-07 DIAGNOSIS — Z13.0 SCREENING FOR IRON DEFICIENCY ANEMIA: ICD-10-CM

## 2024-05-07 DIAGNOSIS — Z13.21 ENCOUNTER FOR VITAMIN DEFICIENCY SCREENING: ICD-10-CM

## 2024-05-07 DIAGNOSIS — Z98.84 HISTORY OF BARIATRIC SURGERY: Primary | ICD-10-CM

## 2024-05-07 DIAGNOSIS — Z48.89 ENCOUNTER FOR POSTOPERATIVE CARE: Primary | ICD-10-CM

## 2024-05-07 PROCEDURE — 99024 POSTOP FOLLOW-UP VISIT: CPT | Mod: POP,,, | Performed by: SURGERY

## 2024-05-07 NOTE — PROGRESS NOTES
"    [facility]  [address] [place] [address]  Phone: [phone]  Fax: [phone]    [facility]  Post op Bariatric Note  Dr. Malcom Hathaway     Patient: Teagan Aldrich  Date: 05/07/2024   Author: Delaney Schaefer ANP    Chief Complaint: No chief complaint on file.      POD# 15 s/p Laparoscopic takedown of previous Nissen fundoplication, Laparoscopic gastrojejunostomy with Gisela-en-Y reconstruction, BELKYS block, placement of ZIA drain       History of Present Illness:  Ms. Teagan Aldrich is a 58 y.o. female who is 2 weeks s/p Lap GJ bypass, takedown previous Nissen fundoplication on 4/22/24 by Dr. Malcom Hathaway.   Pathology benign.     In the initial post op period, pt did experience several large bloody Bms, required 4 units PRBCs blood transfusion, responded well to blood transfusion, stabilized without re-operation. Discharged to home on POD# 3.  DC H/H 10.3/31.     Presents today for routine 2 week follow up appt.   No complaints. No dysphagia, odynophagia, GERD, NV, or abd pain. Regular BMs.     Diet: compliant with bariatric meal plan, tolerating bariatric clears/puree  Exercise: regular exercise, walking   Vitamins: compliant with bariatric supplementation per protocol    Height: 67 in.     Weights:     Trend Weights BMI   Pre-Op 231 # 36.18   2 Week     2 Month     6 Month      1 Year     2 Year       Estimated body mass index is 33.67 kg/m² as calculated from the following:    Height as of an earlier encounter on 5/7/24: 5' 7" (1.702 m).    Weight as of an earlier encounter on 5/7/24: 97.5 kg (215 lb).          Pertinent History:  HTN - [x] Yes   [] No    [] Resolved  HLD - [x] Yes   [] No    [] Resolved  DM  -  [x] Yes   [] No    [] Resolved  CHRIS - [x] Yes   [] No   [] Resolved  GERD - [] Yes   [] No [x] Resolved  Anticoagulation- [x] Yes   [] No  If yes, type: [x] ASA [] Plavix [] " Other    Patient Care Team:  Teagan Galdamez MD as PCP - General (Family Medicine)  Malcom Hathaway MD as Surgeon (Bariatrics)     Review of Systems:    12 point review of systems conducted, negative except as stated in the history of present illness. See HPI for details.    Review of patient's allergies indicates:   Allergen Reactions    Bactrim [sulfamethoxazole-trimethoprim]      Past Medical History:   Diagnosis Date    Abdominal pain     Anal polyp     Anxiety     Coronary artery disease     Depression     Diabetes mellitus     Disorder of lumbar spine     Diverticul disease small and large intestine, no perforati or abscess     GERD (gastroesophageal reflux disease)     Heart murmur     Heartburn     Hyperlipidemia     Hypertension     Irregular heart rhythm     Migraine     Morbid obesity     Nausea     Obesity     Panic attack     Sleep apnea     non use of equipment    SVT (supraventricular tachycardia)     Thyroid disease      Past Surgical History:   Procedure Laterality Date    BACK SURGERY      06/2022    CARPAL TUNNEL RELEASE Bilateral     CHOLECYSTECTOMY      COLONOSCOPY      ESOPHAGOGASTRODUODENOSCOPY      hemorroidectomy      JOINT REPLACEMENT      KNEE SURGERY      06/25/2023    LAMINECTOMY      LAPAROSCOPIC CREATION OF BYPASS FROM DUODENUM TO JEJUNUM N/A 4/22/2024    Procedure: CREATION, BYPASS, DUODENUM TO JEJUNUM, LAPAROSCOPIC;  Surgeon: Malcom Hathaway MD;  Location: [facility];  Service: General;  Laterality: N/A;  Lap RNY GBP.Dr. Danny Bernstein to assist    LEFT HEART CATHETERIZATION      NECK SURGERY      12/2019    NISSEN FUNDOPLICATION  2007    ROTATOR CUFF REPAIR Right     TONSILLECTOMY      TUBAL LIGATION       Family History   Problem Relation Name Age of Onset    Diabetes Mother      Depression Mother      Hypertension Mother      Hyperlipidemia Mother      Hypertension Father      Hyperlipidemia Father      Heart disease Father      Diabetes Father      Depression Father        Social History     Tobacco Use    Smoking status: Former     Types: Cigarettes    Smokeless tobacco: Former   Substance Use Topics    Alcohol use: Not Currently    Drug use: Never      Current Outpatient Medications   Medication Instructions    ALPRAZolam (XANAX) 0.5 mg, Oral, 2 times daily PRN    aspirin (ECOTRIN) 81 mg, Oral, Daily, Hold ASA 4 weeks post op after surgery.    atorvastatin (LIPITOR) 20 mg, Oral, Daily    biotin 5,000 mcg TbDL Oral, Hold postop     carbinoxamine maleate 4 mg, Oral, As needed (PRN)    cholecalciferol, vitamin D3, (VITAMIN D3) 50 mcg (2,000 unit) Cap capsule Oral, Daily    cranberry extract 500 mg Cap Oral, Stop 2 wks pre op and 4 wks postop     diltiaZEM (CARDIZEM CD) 120 mg, Oral, 3 times daily    esomeprazole (NEXIUM) 40 mg, Oral, Before breakfast, Hold Nexium while taking Protonix for first 30 days post op    estradioL (ESTRACE) 0.5 mg, Oral, Daily, Stop 30 days pre op and post op     famotidine (PEPCID) 20 mg, Oral, Daily    glipiZIDE (GLUCOTROL) 2.5 mg, Oral, 2 times daily, Ask orescribing doctor about pre op dose before starting pre op diet ask about post op     isosorbide dinitrate (ISORDIL) 5 mg, Oral, 2 times daily    levothyroxine (SYNTHROID) 50 mcg, Oral, Before breakfast    magnesium 400 mg, Oral, Daily    metFORMIN (GLUCOPHAGE) 1,000 mg, Oral, 2 times daily, Call prescribing PD about prt op dose before starting pre op diet ask about post op dose     montelukast (SINGULAIR) 10 mg, Oral, Nightly    multivitamin with minerals tablet 1 tablet, Oral, Daily    ondansetron (ZOFRAN-ODT) 4 mg, Oral, Every 6 hours PRN    pantoprazole (PROTONIX) 40 mg, Oral, Daily, Take once daily for first 30 days post op. Hold home med of Nexium while taking Protonix.    progesterone (PROMETRIUM) 100 mg, Oral, Daily    promethazine (PHENERGAN) 12.5 mg, Oral, Every 6 hours PRN    sertraline (ZOLOFT) 150 mg, Oral, Nightly    sotaloL (BETAPACE) 80 mg, Oral, 2 times daily    XARELTO 2.5 mg, Oral,  Daily, Ok to resume pre op med of Xarelto 1 week after surgery    zinc gluconate 50 mg, Oral, Daily       Objective:     Vital Signs (Most Recent):  There were no vitals taken for this visit.    Physical Exam:  General: Alert and oriented, No acute distress.  Respiratory: Clear to auscultation bilaterally; No wheezes, rales or rhonchi,Non-labored respirations  Cardiovascular: Regular rate and rhythm   Gastrointestinal: Abd soft, Non-tender, Non-distended, Bowel sounds present, Lap sites clean dry and intact, no evidence of infection.   Musculoskeletal: Normal range of motion.  Integumentary: Warm, Dry, Intact  Neurologic: No focal deficits  Psychiatric: Appropriate affect    Assessment and Plan:       ICD-10-CM ICD-9-CM   1. Encounter for postoperative care  Z48.89 V58.49   2. Screening for iron deficiency anemia  Z13.0 V78.0   3. Encounter for vitamin deficiency screening  Z13.21 V77.99   4. Electrolyte imbalance risk  Z91.89 V49.89   5. Type 2 diabetes mellitus without complication, unspecified whether long term insulin use  E11.9 250.00   6. History of bypass gastrojejunostomy  Z98.0 V45.3       2 weeks post op s/p Laparoscopic takedown of previous Nissen fundoplication, Laparoscopic gastrojejunostomy with Gisela-en-Y reconstruction    - Follow up in 6 weeks with bariatric labs  - Continue diet per bariatric protocol  - Exercise encouraged  - Continue vitamin supplementation  - Support group attendance encouraged  - Keep routine appts with PCP/specialists as scheduled  - Dr. Hathaway examined patient, discussed recommendations, and answered all questions.     KARLOS Greenberg      I, KARLOS Hickey, am scribing for, and in the presence of, Malcom Hathaway MD, performed the above scribed service and the documentation accurately describes the services I performed. I attest to the accuracy of the note.

## 2024-05-07 NOTE — PROGRESS NOTES
"POST OP BARIATRIC NUTRITION FOLLOW UP    Type of surgery: sleeve gastrectomy  Post-op visit:   [x] 2 weeks  [] 4 weeks:  [] 2 months:  [] 4 months:  [] 6 months:  [] 9 months:  [] 1 year:   [] Other:     Height:   Ht Readings from Last 1 Encounters:   05/07/24 5' 7" (1.702 m)      Weight:   Wt Readings from Last 3 Encounters:   05/07/24 0816 97.5 kg (215 lb)   04/24/24 1206 104 kg (229 lb 4.5 oz)   04/24/24 0819 104 kg (229 lb 4.5 oz)   04/24/24 0817 104 kg (229 lb 4.5 oz)   04/22/24 0737 104 kg (229 lb 4.5 oz)   04/12/24 1140 104.8 kg (231 lb)   04/15/24 1451 104.9 kg (231 lb 3.2 oz)      BMI:   BMI Readings from Last 1 Encounters:   05/07/24 33.67 kg/m²            Post op complications:   [] Yes [x] No  If yes: [] Nausea   [] Vomiting   [] Constipation    [] Diarrhea    [] Other:     Dietary tolerance:  [x] Yes [] No [] Comment:     Adequate fluid intake:  [x] Yes [] No Approx. daily fluid intake: 60-70 fl oz/d  Adequate protein intake:  [x] Yes [] No  Approx. daily protein intake:    Vitamins/Minerals:   [x] MVI:    [] Biotin:  [x] Calcium:    [] Hair/Nails:  [] B 50 complex:   [] Bariatric Specific MVI:  [] B12:    [] Bariatric Specific Calcium:  [x] Iron:    [] Other:   [] Non-compliance:        Labs:   not ordered for this visit.  Comment:    Expected compliance:  [x]Good  []Fair  []Poor      Progress:   [x]Patient progressing well at this time with no complaints   [] Patient expressed complaint at this time: See additional notes       Bariatric Diet Education:   Verbalizes understanding Demonstrates  Needs further teaching Needs practice/ supervision Comments    Bariatric Clear [] [] [] []    Bariatric Full [] [] [] []    Bariatric Puree [x] [] [] []    Bariatric Soft [x] [] [] []    Bariatric Regular [] [] [] []    Bariatric Reintroduction of Starchy CHO [] [] [] []    Bariatric: Mindful Eating [] [] [] []    Importance of Protein and Vitamin Protocol [] [] [] []    Other:            Additional Notes:    "

## 2024-05-23 ENCOUNTER — PATIENT MESSAGE (OUTPATIENT)
Dept: ADMINISTRATIVE | Facility: OTHER | Age: 59
End: 2024-05-23
Payer: MEDICARE

## 2024-05-29 ENCOUNTER — TELEPHONE (OUTPATIENT)
Dept: SURGERY | Facility: CLINIC | Age: 59
End: 2024-05-29
Payer: MEDICARE

## 2024-05-29 NOTE — TELEPHONE ENCOUNTER
Pt called and left voice mail she would like to know when she can have a nerve release, she just had Bypass on 04/22/24

## 2024-05-30 NOTE — TELEPHONE ENCOUNTER
Not sure what she means by a nerve release. Is that surgery as in anesthesia, an intra spinal injection, or chiropractor adjustment?   If it is under anesthesia we recommend waiting 60 days from surgery, so another 3 weeks

## 2024-06-06 ENCOUNTER — LAB VISIT (OUTPATIENT)
Dept: LAB | Facility: HOSPITAL | Age: 59
End: 2024-06-06
Attending: SURGERY
Payer: MEDICARE

## 2024-06-06 DIAGNOSIS — Z13.21 ENCOUNTER FOR VITAMIN DEFICIENCY SCREENING: ICD-10-CM

## 2024-06-06 DIAGNOSIS — Z91.89 ELECTROLYTE IMBALANCE RISK: ICD-10-CM

## 2024-06-06 DIAGNOSIS — E11.9 TYPE 2 DIABETES MELLITUS WITHOUT COMPLICATION, UNSPECIFIED WHETHER LONG TERM INSULIN USE: ICD-10-CM

## 2024-06-06 DIAGNOSIS — Z13.0 SCREENING FOR IRON DEFICIENCY ANEMIA: ICD-10-CM

## 2024-06-06 LAB
ALBUMIN SERPL-MCNC: 4.1 G/DL (ref 3.5–5)
ALBUMIN/GLOB SERPL: 1.4 RATIO (ref 1.1–2)
ALP SERPL-CCNC: 121 UNIT/L (ref 40–150)
ALT SERPL-CCNC: 37 UNIT/L (ref 0–55)
ANION GAP SERPL CALC-SCNC: 8 MEQ/L
AST SERPL-CCNC: 29 UNIT/L (ref 5–34)
BASOPHILS # BLD AUTO: 0.04 X10(3)/MCL
BASOPHILS NFR BLD AUTO: 0.5 %
BILIRUB SERPL-MCNC: 0.4 MG/DL
BUN SERPL-MCNC: 10.4 MG/DL (ref 9.8–20.1)
CALCIUM SERPL-MCNC: 9.8 MG/DL (ref 8.4–10.2)
CHLORIDE SERPL-SCNC: 105 MMOL/L (ref 98–107)
CO2 SERPL-SCNC: 24 MMOL/L (ref 22–29)
CREAT SERPL-MCNC: 0.68 MG/DL (ref 0.55–1.02)
CREAT/UREA NIT SERPL: 15
EOSINOPHIL # BLD AUTO: 0.26 X10(3)/MCL (ref 0–0.9)
EOSINOPHIL NFR BLD AUTO: 3.4 %
ERYTHROCYTE [DISTWIDTH] IN BLOOD BY AUTOMATED COUNT: 14.7 % (ref 11.5–17)
EST. AVERAGE GLUCOSE BLD GHB EST-MCNC: 114 MG/DL
GFR SERPLBLD CREATININE-BSD FMLA CKD-EPI: >60 ML/MIN/1.73/M2
GLOBULIN SER-MCNC: 2.9 GM/DL (ref 2.4–3.5)
GLUCOSE SERPL-MCNC: 107 MG/DL (ref 74–100)
HBA1C MFR BLD: 5.6 %
HCT VFR BLD AUTO: 40.6 % (ref 37–47)
HGB BLD-MCNC: 13 G/DL (ref 12–16)
IMM GRANULOCYTES # BLD AUTO: 0.02 X10(3)/MCL (ref 0–0.04)
IMM GRANULOCYTES NFR BLD AUTO: 0.3 %
IRON SATN MFR SERPL: 20 % (ref 20–50)
IRON SERPL-MCNC: 61 UG/DL (ref 50–170)
LYMPHOCYTES # BLD AUTO: 1.34 X10(3)/MCL (ref 0.6–4.6)
LYMPHOCYTES NFR BLD AUTO: 17.5 %
MCH RBC QN AUTO: 29.7 PG (ref 27–31)
MCHC RBC AUTO-ENTMCNC: 32 G/DL (ref 33–36)
MCV RBC AUTO: 92.9 FL (ref 80–94)
MONOCYTES # BLD AUTO: 0.35 X10(3)/MCL (ref 0.1–1.3)
MONOCYTES NFR BLD AUTO: 4.6 %
NEUTROPHILS # BLD AUTO: 5.65 X10(3)/MCL (ref 2.1–9.2)
NEUTROPHILS NFR BLD AUTO: 73.7 %
NRBC BLD AUTO-RTO: 0 %
PLATELET # BLD AUTO: 366 X10(3)/MCL (ref 130–400)
PMV BLD AUTO: 9.5 FL (ref 7.4–10.4)
POTASSIUM SERPL-SCNC: 4.6 MMOL/L (ref 3.5–5.1)
PROT SERPL-MCNC: 7 GM/DL (ref 6.4–8.3)
RBC # BLD AUTO: 4.37 X10(6)/MCL (ref 4.2–5.4)
SODIUM SERPL-SCNC: 137 MMOL/L (ref 136–145)
TIBC SERPL-MCNC: 249 UG/DL (ref 70–310)
TIBC SERPL-MCNC: 310 UG/DL (ref 250–450)
TRANSFERRIN SERPL-MCNC: 293 MG/DL (ref 180–382)
VIT B12 SERPL-MCNC: 440 PG/ML (ref 213–816)
WBC # SPEC AUTO: 7.66 X10(3)/MCL (ref 4.5–11.5)

## 2024-06-06 PROCEDURE — 36415 COLL VENOUS BLD VENIPUNCTURE: CPT

## 2024-06-12 LAB — VIT B1 BLD-SCNC: 165 NMOL/L (ref 70–180)

## 2024-06-14 NOTE — PROGRESS NOTES
Labs reviewed. Normal results. Please inform patient of normal lab findings. Keep appt with Julia Baca NP as scheduled for 6/17/24. Please let me know if patient has any additional questions or concerns.

## 2024-06-17 ENCOUNTER — OFFICE VISIT (OUTPATIENT)
Dept: SURGERY | Facility: CLINIC | Age: 59
End: 2024-06-17
Payer: MEDICARE

## 2024-06-17 ENCOUNTER — CLINICAL SUPPORT (OUTPATIENT)
Dept: BARIATRICS | Facility: HOSPITAL | Age: 59
End: 2024-06-17

## 2024-06-17 ENCOUNTER — TELEPHONE (OUTPATIENT)
Dept: SURGERY | Facility: CLINIC | Age: 59
End: 2024-06-17

## 2024-06-17 VITALS
SYSTOLIC BLOOD PRESSURE: 119 MMHG | WEIGHT: 207 LBS | WEIGHT: 208 LBS | HEART RATE: 85 BPM | TEMPERATURE: 99 F | DIASTOLIC BLOOD PRESSURE: 80 MMHG | HEIGHT: 67 IN | BODY MASS INDEX: 32.49 KG/M2 | BODY MASS INDEX: 32.58 KG/M2

## 2024-06-17 DIAGNOSIS — Z91.89 ELECTROLYTE IMBALANCE RISK: ICD-10-CM

## 2024-06-17 DIAGNOSIS — E11.9 TYPE 2 DIABETES MELLITUS WITHOUT COMPLICATION, UNSPECIFIED WHETHER LONG TERM INSULIN USE: ICD-10-CM

## 2024-06-17 DIAGNOSIS — Z13.21 ENCOUNTER FOR VITAMIN DEFICIENCY SCREENING: Primary | ICD-10-CM

## 2024-06-17 DIAGNOSIS — Z13.0 SCREENING, ANEMIA, DEFICIENCY, IRON: ICD-10-CM

## 2024-06-17 DIAGNOSIS — E66.9 OBESITY: Primary | ICD-10-CM

## 2024-06-17 DIAGNOSIS — R10.9 ABDOMINAL PAIN, UNSPECIFIED ABDOMINAL LOCATION: ICD-10-CM

## 2024-06-17 DIAGNOSIS — E66.9 OBESITY (BMI 30-39.9): Primary | ICD-10-CM

## 2024-06-17 PROCEDURE — 99024 POSTOP FOLLOW-UP VISIT: CPT | Mod: POP,,, | Performed by: NURSE PRACTITIONER

## 2024-06-17 NOTE — PROGRESS NOTES
"Progress Note  Teagan Aldrich  Chief Complaint   Patient presents with    Post-op Evaluation     RNY GBP (revision) 4/22/24     History of Present Illness:  Ms. Teagan Aldrich is a 58 y.o. female who is 2 mths s/p Lap GJ bypass, takedown previous Nissen fundoplication on 4/22/24 by Dr. Malcom Hathaway.   Pathology benign.      In the initial post op period, pt did experience several large bloody Bms, required 4 units PRBCs blood transfusion, responded well to blood transfusion, stabilized without re-operation. Discharged to home on POD# 3.  DC H/H 10.3/31.      Presents today for routine 2 mth follow up appt.   No complaints. No dysphagia, odynophagia, GERD, NV, or abd pain. Regular BMs.      2 days, intermittent abd cramping, last for about 5-10min, exacerbated with movement, not associated with Bms or eating. Pt did admit that she was doing some crunches in the pool which she forgot about until we started talking about her abd cramping.     Diet: compliant with bariatric meal plan, pt is eating beans which is not on the diet plan.   Exercise: regular exercise, walking 1500 to 3000 steps daily and the pool as well.   Vitamins: not compliant with bariatric supplementation per protocol. "Slacking"      Height: 67 in.   Weights:   Trend Weights BMI   Pre-Op 231 # 36.18   2 Week 207#  32   2 Month       6 Month        1 Year       2 Year          Estimated body mass index is 33.67 kg/m² as calculated from the following:    Height as of an earlier encounter on 5/7/24: 5' 7" (1.702 m).    Weight as of an earlier encounter on 5/7/24: 97.5 kg (215 lb).                Pertinent History:  HTN - [x] Yes   [] No    [] Resolved  HLD - [x] Yes   [] No    [] Resolved  DM  -  [x] Yes   [] No    [] Resolved  CHRIS - [x] Yes   [] No   [] Resolved  GERD - [] Yes   [] No [x] Resolved  Anticoagulation- [x] Yes   [] No      If yes, type: [x] ASA [] Plavix [] Other    Patient Care Team:  Teagan Galdamez MD as PCP - General " (Family Medicine)  Malcom Hathaway MD as Surgeon (Bariatrics)     Subjective:     12 point review of systems conducted, negative except as stated in the history of present illness. See HPI for details.    Review of patient's allergies indicates:   Allergen Reactions    Bactrim [sulfamethoxazole-trimethoprim]      Past Medical History:   Diagnosis Date    Abdominal pain     Anal polyp     Anxiety     Coronary artery disease     Depression     Diabetes mellitus     Disorder of lumbar spine     Diverticul disease small and large intestine, no perforati or abscess     GERD (gastroesophageal reflux disease)     Heart murmur     Heartburn     Hyperlipidemia     Hypertension     Irregular heart rhythm     Migraine     Morbid obesity     Nausea     Obesity     Panic attack     Sleep apnea     non use of equipment    SVT (supraventricular tachycardia)     Thyroid disease      Past Surgical History:   Procedure Laterality Date    BACK SURGERY      06/2022    CARPAL TUNNEL RELEASE Bilateral     CHOLECYSTECTOMY      COLONOSCOPY      ESOPHAGOGASTRODUODENOSCOPY      hemorroidectomy      JOINT REPLACEMENT      KNEE SURGERY      06/25/2023    LAMINECTOMY      LAPAROSCOPIC CREATION OF BYPASS FROM DUODENUM TO JEJUNUM N/A 4/22/2024    Procedure: CREATION, BYPASS, DUODENUM TO JEJUNUM, LAPAROSCOPIC;  Surgeon: Malcom Hathaway MD;  Location: [facility];  Service: General;  Laterality: N/A;  Lap RNY GBP.Dr. Danny Bernstein to assist    LEFT HEART CATHETERIZATION      NECK SURGERY      12/2019    NISSEN FUNDOPLICATION  2007    ROTATOR CUFF REPAIR Right     TONSILLECTOMY      TUBAL LIGATION       Family History   Problem Relation Name Age of Onset    Diabetes Mother      Depression Mother      Hypertension Mother      Hyperlipidemia Mother      Hypertension Father      Hyperlipidemia Father      Heart disease Father      Diabetes Father      Depression Father       Social History     Tobacco Use    Smoking status: Former     Types:  "Cigarettes    Smokeless tobacco: Former   Substance Use Topics    Alcohol use: Not Currently    Drug use: Never      Current Outpatient Medications   Medication Instructions    ALPRAZolam (XANAX) 0.5 mg, Oral, 2 times daily PRN    aspirin (ECOTRIN) 81 mg, Oral, Daily, Hold ASA 4 weeks post op after surgery.    atorvastatin (LIPITOR) 20 mg, Oral, Daily    biotin 5,000 mcg TbDL Oral, Hold postop     carbinoxamine maleate 4 mg, Oral, As needed (PRN)    cholecalciferol, vitamin D3, (VITAMIN D3) 50 mcg (2,000 unit) Cap capsule Oral, Daily    cranberry extract 500 mg Cap Oral, Stop 2 wks pre op and 4 wks postop     diltiaZEM (CARDIZEM CD) 120 mg, Oral, 3 times daily    esomeprazole (NEXIUM) 40 mg, Oral, Before breakfast, Hold Nexium while taking Protonix for first 30 days post op    estradioL (ESTRACE) 0.5 mg, Oral, Daily, Stop 30 days pre op and post op     famotidine (PEPCID) 20 mg, Oral, Daily    isosorbide dinitrate (ISORDIL) 5 mg, Oral, 2 times daily    levothyroxine (SYNTHROID) 50 mcg, Oral, Before breakfast    magnesium 400 mg, Oral, Daily    metFORMIN (GLUCOPHAGE) 500 mg, Oral, 2 times daily, Call prescribing PD about prt op dose before starting pre op diet ask about post op dose     montelukast (SINGULAIR) 10 mg, Oral, Nightly    multivitamin with minerals tablet 1 tablet, Oral, Daily    pantoprazole (PROTONIX) 40 mg, Oral, Daily, Take once daily for first 30 days post op. Hold home med of Nexium while taking Protonix.    progesterone (PROMETRIUM) 100 mg, Oral, Daily    sertraline (ZOLOFT) 150 mg, Oral, Nightly    sotaloL (BETAPACE) 80 mg, Oral, 2 times daily    XARELTO 2.5 mg, Oral, Daily, Ok to resume pre op med of Xarelto 1 week after surgery    zinc gluconate 50 mg, Oral, Daily       Objective:     Vital Signs (Most Recent):  Visit Vitals  /80   Pulse 85   Temp 98.5 °F (36.9 °C)   Ht 5' 7" (1.702 m)   Wt 93.9 kg (207 lb)   BMI 32.42 kg/m²       Physical Exam:  General:  Alert and oriented.  "   Respiratory:  Lungs are clear to auscultation, Respirations are non-labored, Breath sounds are equal.    Cardiovascular:  Normal rate, Regular rhythm, No murmur.    Gastrointestinal:  Soft, Non-distended, Normal bowel sounds. Mild tenderness to L side of abd.   Musculoskeletal:  Normal range of motion, Normal strength.    Integumentary:  Warm, Dry, Pink.    Neurologic:  Alert, Oriented.    Psychiatric:  Cooperative.      Assessment:       ICD-10-CM ICD-9-CM   1. Encounter for vitamin deficiency screening  Z13.21 V77.99   2. Screening, anemia, deficiency, iron  Z13.0 V78.0   3. Electrolyte imbalance risk  Z91.89 V49.89   4. Type 2 diabetes mellitus without complication, unspecified whether long term insulin use  E11.9 250.00   5. Abdominal pain, unspecified abdominal location  R10.9 789.00       Plan:     1. Encounter for vitamin deficiency screening  Vitamin B1, WB    Vitamin B12      2. Screening, anemia, deficiency, iron  Iron and TIBC    CBC Auto Differential      3. Electrolyte imbalance risk  Comprehensive Metabolic Panel      4. Type 2 diabetes mellitus without complication, unspecified whether long term insulin use  Hemoglobin A1C      5. Abdominal pain, unspecified abdominal location            Plan:  - gave pt information about the bariatric one a day multivitamin. Need to continue calcium supplements while on them. continue  vitamin since it contains iron. please eat before taking, it can make pt's very nauseated on empty stomach.   - discussed diet with patient. She is not allowed to have beans at this time. Discussed different recipes that she can try and to read in her book what is appropriate to eat now.   - abd pain sounds more musculoskeletal at this time. Monitor over the next few days and please call the office if pain is increasing, constant, nausea, vomiting, fever, etc. Discussed signs and symptoms of internal hernia.    - use pool to your advantage with exercise katerina since having  issues with knee.   - F/U 4 months with bariatric labs  - Continue diet  - Continue vitamin supplementation  - Support group attendance encouraged  - Keep routine appts with PCP/specialists as scheduled

## 2024-06-17 NOTE — PROGRESS NOTES
A 2 month F/U post op visit was conducted in the office. Patient's current weight is 208.1 LBS. A body composition and measurements were conducted.  Patient was educated on results. She lost 31 lbs. And 19 inches. She is not exercising due to knee pain. But, does try to stay active.      PLAN:  - Patient will walk 30 minutes or more 3-5x/week and start strengthening exercises 2x/week.  - Patient will follow dietary advice as per ELVIA Vallejo RD      It is my professional opinion that patient is in the preparation stage of behavior change.    PRABHA Marino, CPT, CHC

## 2024-06-17 NOTE — TELEPHONE ENCOUNTER
----- Message from Sada TERIshaka Marshall sent at 2024  9:07 AM CDT -----  Regarding: [employer]  Please review and address [employer] : create encounter when completed    ======================================  Sun 24 07:30p TAKEN  Sun 16-Jin-24 07:30p DELIVERED  To......: Dr Eddie Hathaway  Name....: Teagantana Koehlernannette  Phone #.: [phone]  Patient.: Same  Pt .: 1965  Primary.: Dr Eddie Hathaway  Details.: had a gastric bypass; Stomach has been in pain since this morning

## 2024-06-17 NOTE — PROGRESS NOTES
Returned call 6/16/24    New onset intermittent crampy abdominal pain    Denies persistence   Severe for a few seconds   Denies DCNV    Denies recent new foods    Denies fever    Provided reassurance   Discussed possibility of intermittent internal hernia v SBO v intussusception     Provided ED precautions     Scheduled to see Niko MAHAJAN 6/17/24  Call prn

## 2024-06-17 NOTE — PROGRESS NOTES
"POST OP BARIATRIC NUTRITION FOLLOW UP    Type of surgery: laparoscopic Gisela-en-Y  Post-op visit:   [] 2 weeks  [] 4 weeks:  [x] 2 months:  [] 4 months:  [] 6 months:  [] 9 months:  [] 1 year:   [] Other:     Height:   Ht Readings from Last 1 Encounters:   05/07/24 5' 7" (1.702 m)      Weight:   Wt Readings from Last 3 Encounters:   06/17/24 1410 94.3 kg (208 lb)   05/07/24 1409 98.4 kg (217 lb)   05/07/24 0816 97.5 kg (215 lb)      BMI:   BMI Readings from Last 1 Encounters:   06/17/24 32.58 kg/m²        Post op complications:   [] Yes [x] No  If yes: [] Nausea   [] Vomiting   [] Constipation    [] Diarrhea    [] Other:     Dietary tolerance:  [x] Yes [] No [] Comment:     Adequate fluid intake:  [] Yes [x] No Approx. daily fluid intake: 32 oz  Adequate protein intake:  [x] Yes [] No  Approx. daily protein intake: 70 g    Vitamins/Minerals:   [x] MVI:    [] Biotin:  [x] Calcium:    [] Hair/Nails:  [] B 50 complex:   [] Bariatric Specific MVI:  [] B12:    [] Bariatric Specific Calcium:  [x] Iron:    [] Other:   [] Non-compliance:      Labs:   Glu (H)  Comment: all other labs normal range    Expected compliance:  []Good  [x]Fair  []Poor    Progress:   []Patient progressing well at this time with no complaints   [x] Patient expressed complaint at this time: See additional notes     Bariatric Diet Education:   Verbalizes understanding Demonstrates  Needs further teaching Needs practice/ supervision Comments    Bariatric Clear [] [] [] []    Bariatric Full [] [] [] []    Bariatric Puree [] [] [] []    Bariatric Soft [] [] [] []    Bariatric Regular [x] [] [] []    Bariatric Reintroduction of Starchy CHO [] [] [] []    Bariatric: Mindful Eating [x] [] [] []    Importance of Protein and Vitamin Protocol [x] [] [] []    Other:            Additional Notes:   Pt has been having stomach pains that go on and off for past few days. We discussed her diet- she has tried SF ice cream (rec'd she not eat this anymore and avoid " options not on diet list). She also has diverticulitis and claims she eats multiple fruits throughout the day, rec'd she limit fruit intake and snack on other items to discern if this is contributing to issue. Pt is struggling with water intake- rec'd to increase to provide adequate hydration. Taking vitamins as rec'd.

## 2024-08-09 ENCOUNTER — TELEPHONE (OUTPATIENT)
Dept: SURGERY | Facility: CLINIC | Age: 59
End: 2024-08-09
Payer: MEDICARE

## 2024-08-09 NOTE — TELEPHONE ENCOUNTER
DAVID TREJO (revision) 4/22/24     Pt called  stating she has been having severe acid reflux x 1 week. Denies eating anything that would cause it. Taking tums without relief. Stated last night it was so bad she vomited acid.   Please advise   # [phone]

## 2024-08-12 RX ORDER — PANTOPRAZOLE SODIUM 40 MG/1
40 TABLET, DELAYED RELEASE ORAL DAILY
Qty: 90 TABLET | Refills: 3 | Status: SHIPPED | OUTPATIENT
Start: 2024-08-12

## 2024-08-12 NOTE — TELEPHONE ENCOUNTER
Spoke with pt she has not vomited since she has last spoken to the office. She is only on tums, informed her that protonix was called in and if needed she could take the pepcid or nexium once daily, will call her in a few days to see how she is feeling if symptoms do not get better we will order an UGI   Pt voiced understanding, placed reminder

## 2024-08-12 NOTE — TELEPHONE ENCOUNTER
Jero, call pt and see how she is doing. I called her in some Protonix or if she has protonix, pepcid, or nexium at home she can begin taking any of those once daily to see if that helps. If it does not help over the next few days we will get and UGI.

## 2024-08-16 ENCOUNTER — TELEPHONE (OUTPATIENT)
Dept: SURGERY | Facility: CLINIC | Age: 59
End: 2024-08-16
Payer: MEDICARE

## 2024-08-16 NOTE — TELEPHONE ENCOUNTER
Patient is feeling much better would like to hold off on ordering the UGI     Will call us if she need it in the future

## 2024-08-16 NOTE — TELEPHONE ENCOUNTER
----- Message from Pat Cunningham MA sent at 8/12/2024  8:57 AM CDT -----  Fu on GERD   Pt stated protonix again and will possibly need an UGI

## 2024-08-21 ENCOUNTER — TELEPHONE (OUTPATIENT)
Dept: SURGERY | Facility: CLINIC | Age: 59
End: 2024-08-21
Payer: MEDICARE

## 2024-08-21 NOTE — TELEPHONE ENCOUNTER
Spoke with patient she had just got off of the phone with the dietician   Patient thinks she could be causing herself the reflux  She is eating to big or portions to fast and not chewing well enough, also stated that she does have more of these issues when she is eating day old meat since it is drier.   She brought pepcid to take once in the morning and protonix to take at night.  She will wait to see if adjusting her eating will help if not then we will order the UGI - pt declined getting one at this time

## 2024-08-21 NOTE — TELEPHONE ENCOUNTER
----- Message from Hazel Noland MA sent at 8/21/2024  1:03 PM CDT -----  Regarding: voicemail  Pt left a voicemail wanting to speak with someone about some nausea/vomiting she is having  Didn't leave much other detail  # [phone]

## 2024-08-23 RX ORDER — ONDANSETRON 4 MG/1
4 TABLET, FILM COATED ORAL EVERY 6 HOURS PRN
Qty: 20 TABLET | Refills: 0 | Status: SHIPPED | OUTPATIENT
Start: 2024-08-23

## 2024-08-23 NOTE — PROGRESS NOTES
Called pt to f/u on nausea. Pt having some vomiting after eating, trying to eat 3-4oz at one time. We discussed her anatomy and that she is trying to eat too much of a volume of food at one time. She is to go to liquids for the next two days and then try to advance to soft foods again on Sunday.     If she is still having problems then needs UGI and schedule with G to discuss possible EGD w/dilation.     F/u with pt on Monday to see how she is feeling  I called her in some zofran but it needed at [facility] and no one was here to do it so I suggested that if she cannot get it from her pharmacy then she needs to call her PCP about it.

## 2024-08-26 NOTE — PROGRESS NOTES
"Called to f/u w pt. She is doing "much better" denies any nausea. Ate soft foods yesterday and was a little "nauseated" last night before bed after eating all day but has limited her portion size which has helped quite a bit. Never had to  her script of zofran that I called in for her.   Again only has dysphagia with steak and large pills.     Will f/u with pt next week  Has f/u on 9/23 and will see about possible UGI at that time or need for EGD.   "

## 2024-08-27 DIAGNOSIS — R13.10 DYSPHAGIA, UNSPECIFIED TYPE: Primary | ICD-10-CM

## 2024-08-29 ENCOUNTER — TELEPHONE (OUTPATIENT)
Dept: SURGERY | Facility: CLINIC | Age: 59
End: 2024-08-29
Payer: MEDICARE

## 2024-08-29 ENCOUNTER — HOSPITAL ENCOUNTER (OUTPATIENT)
Dept: RADIOLOGY | Facility: HOSPITAL | Age: 59
Discharge: HOME | End: 2024-08-29
Attending: SURGERY
Payer: MEDICARE

## 2024-08-29 DIAGNOSIS — R13.10 DYSPHAGIA, UNSPECIFIED TYPE: ICD-10-CM

## 2024-08-29 PROCEDURE — 74240 X-RAY XM UPR GI TRC 1CNTRST: CPT | Mod: TC

## 2024-08-29 NOTE — TELEPHONE ENCOUNTER
----- Message from Rosita Cervantes MA sent at 8/27/2024  4:12 PM CDT -----  Regarding: Results FU  FU with UGI results 8/29

## 2024-08-29 NOTE — TELEPHONE ENCOUNTER
Called and discussed with pt. She is feeling better told her to call if she needed. Patrick if nausea/vomiting gets worse.

## 2024-08-29 NOTE — PROGRESS NOTES
Called and spoke with pt. Notes that she is feeling better. Mild nausea but otherwise tolerating fluids and most foods that she has tried. Discussed UGI. No abnormalities.   Asking about skin removal surgery. Told her we do not suggest this until about 12mths to 18mths postop. Pt verbalized understanding.     Current weight: 190#

## 2024-09-05 ENCOUNTER — TELEPHONE (OUTPATIENT)
Dept: SURGERY | Facility: CLINIC | Age: 59
End: 2024-09-05
Payer: MEDICARE

## 2024-09-05 NOTE — TELEPHONE ENCOUNTER
Pt reports that she is feeling well.     A little nauseated and has not used zofran yet, she was in a rush to go to therapy.     Otherwise, fluids going down well.     Told pt to call if she needed.

## 2024-09-05 NOTE — TELEPHONE ENCOUNTER
----- Message from Rosita Cervantes MA sent at 9/4/2024  1:31 PM CDT -----  Regarding: Nausea  Pt called wanting to speak with you regarding her nausea. She stated it is back and needs advise

## 2024-09-05 NOTE — TELEPHONE ENCOUNTER
Monday night she ate left over pork chop and felt like it got stuck. Pt vomiting multiple times after and as well as Tuesday. Wednesday was much better. Ate a fresh pork chop again Wednesday night and did well with it.     Reeducated patient on dry foods and what would be better for her to eat and tolerate so that she does not continue to have these episodes of foods feeling like they are getting stuck. Pt asked if this is how it will be for the rest of her life and it is a possibility that she will have to avoid certain foods if they continue to be a problem and cause vomiting and feeling like they are getting stuck.     Plan  GI rest  Pt to call this afternoon to let us know how her day is going. Push fluids  Possible need for EGD if this continues to occur but it seems behavioral.

## 2024-09-17 ENCOUNTER — TELEPHONE (OUTPATIENT)
Dept: SURGERY | Facility: CLINIC | Age: 59
End: 2024-09-17
Payer: MEDICARE

## 2024-09-17 NOTE — TELEPHONE ENCOUNTER
Patient will be going to do her labs at [facility] prior to her appt   Orders in

## 2024-09-18 ENCOUNTER — LAB VISIT (OUTPATIENT)
Dept: LAB | Facility: HOSPITAL | Age: 59
End: 2024-09-18
Attending: SURGERY
Payer: MEDICARE

## 2024-09-18 DIAGNOSIS — Z91.89 ELECTROLYTE IMBALANCE RISK: ICD-10-CM

## 2024-09-18 DIAGNOSIS — Z13.21 ENCOUNTER FOR VITAMIN DEFICIENCY SCREENING: ICD-10-CM

## 2024-09-18 DIAGNOSIS — Z13.0 SCREENING, ANEMIA, DEFICIENCY, IRON: ICD-10-CM

## 2024-09-18 DIAGNOSIS — E11.9 TYPE 2 DIABETES MELLITUS WITHOUT COMPLICATION, UNSPECIFIED WHETHER LONG TERM INSULIN USE: ICD-10-CM

## 2024-09-18 LAB
ALBUMIN SERPL-MCNC: 4.1 G/DL (ref 3.5–5)
ALBUMIN/GLOB SERPL: 1.2 RATIO (ref 1.1–2)
ALP SERPL-CCNC: 142 UNIT/L (ref 40–150)
ALT SERPL-CCNC: 31 UNIT/L (ref 0–55)
ANION GAP SERPL CALC-SCNC: 11 MEQ/L
AST SERPL-CCNC: 29 UNIT/L (ref 5–34)
BASOPHILS # BLD AUTO: 0.02 X10(3)/MCL
BASOPHILS NFR BLD AUTO: 0.3 %
BILIRUB SERPL-MCNC: 0.4 MG/DL
BUN SERPL-MCNC: 8.6 MG/DL (ref 9.8–20.1)
CALCIUM SERPL-MCNC: 9.9 MG/DL (ref 8.4–10.2)
CHLORIDE SERPL-SCNC: 103 MMOL/L (ref 98–107)
CO2 SERPL-SCNC: 26 MMOL/L (ref 22–29)
CREAT SERPL-MCNC: 0.75 MG/DL (ref 0.55–1.02)
CREAT/UREA NIT SERPL: 11
EOSINOPHIL # BLD AUTO: 0.25 X10(3)/MCL (ref 0–0.9)
EOSINOPHIL NFR BLD AUTO: 3.7 %
ERYTHROCYTE [DISTWIDTH] IN BLOOD BY AUTOMATED COUNT: 13.3 % (ref 11.5–17)
EST. AVERAGE GLUCOSE BLD GHB EST-MCNC: 125.5 MG/DL
GFR SERPLBLD CREATININE-BSD FMLA CKD-EPI: >60 ML/MIN/1.73/M2
GLOBULIN SER-MCNC: 3.3 GM/DL (ref 2.4–3.5)
GLUCOSE SERPL-MCNC: 98 MG/DL (ref 74–100)
HBA1C MFR BLD: 6 %
HCT VFR BLD AUTO: 44.7 % (ref 37–47)
HGB BLD-MCNC: 14.5 G/DL (ref 12–16)
IMM GRANULOCYTES # BLD AUTO: 0.01 X10(3)/MCL (ref 0–0.04)
IMM GRANULOCYTES NFR BLD AUTO: 0.1 %
IRON SATN MFR SERPL: 19 % (ref 20–50)
IRON SERPL-MCNC: 62 UG/DL (ref 50–170)
LYMPHOCYTES # BLD AUTO: 1.4 X10(3)/MCL (ref 0.6–4.6)
LYMPHOCYTES NFR BLD AUTO: 20.8 %
MCH RBC QN AUTO: 28.6 PG (ref 27–31)
MCHC RBC AUTO-ENTMCNC: 32.4 G/DL (ref 33–36)
MCV RBC AUTO: 88.2 FL (ref 80–94)
MONOCYTES # BLD AUTO: 0.41 X10(3)/MCL (ref 0.1–1.3)
MONOCYTES NFR BLD AUTO: 6.1 %
NEUTROPHILS # BLD AUTO: 4.64 X10(3)/MCL (ref 2.1–9.2)
NEUTROPHILS NFR BLD AUTO: 69 %
NRBC BLD AUTO-RTO: 0 %
PLATELET # BLD AUTO: 324 X10(3)/MCL (ref 130–400)
PMV BLD AUTO: 9.3 FL (ref 7.4–10.4)
POTASSIUM SERPL-SCNC: 5 MMOL/L (ref 3.5–5.1)
PROT SERPL-MCNC: 7.4 GM/DL (ref 6.4–8.3)
RBC # BLD AUTO: 5.07 X10(6)/MCL (ref 4.2–5.4)
SODIUM SERPL-SCNC: 140 MMOL/L (ref 136–145)
TIBC SERPL-MCNC: 263 UG/DL (ref 70–310)
TIBC SERPL-MCNC: 325 UG/DL (ref 250–450)
TRANSFERRIN SERPL-MCNC: 302 MG/DL (ref 180–382)
VIT B12 SERPL-MCNC: 1092 PG/ML (ref 213–816)
WBC # BLD AUTO: 6.73 X10(3)/MCL (ref 4.5–11.5)

## 2024-09-18 PROCEDURE — 36415 COLL VENOUS BLD VENIPUNCTURE: CPT

## 2024-09-22 LAB — VIT B1 BLD-SCNC: 170 NMOL/L (ref 70–180)

## 2024-09-23 ENCOUNTER — CLINICAL SUPPORT (OUTPATIENT)
Dept: BARIATRICS | Facility: HOSPITAL | Age: 59
End: 2024-09-23

## 2024-09-23 ENCOUNTER — OFFICE VISIT (OUTPATIENT)
Dept: SURGERY | Facility: CLINIC | Age: 59
End: 2024-09-23
Payer: MEDICARE

## 2024-09-23 VITALS
BODY MASS INDEX: 28.88 KG/M2 | SYSTOLIC BLOOD PRESSURE: 116 MMHG | WEIGHT: 186.31 LBS | HEIGHT: 67 IN | DIASTOLIC BLOOD PRESSURE: 72 MMHG | HEART RATE: 75 BPM | WEIGHT: 184 LBS | BODY MASS INDEX: 29.18 KG/M2

## 2024-09-23 DIAGNOSIS — Z71.3 DIETARY COUNSELING: ICD-10-CM

## 2024-09-23 DIAGNOSIS — E66.9 OBESITY: Primary | ICD-10-CM

## 2024-09-23 DIAGNOSIS — E11.9 TYPE 2 DIABETES MELLITUS WITHOUT COMPLICATION, UNSPECIFIED WHETHER LONG TERM INSULIN USE: ICD-10-CM

## 2024-09-23 DIAGNOSIS — Z91.89 ELECTROLYTE IMBALANCE RISK: ICD-10-CM

## 2024-09-23 DIAGNOSIS — Z71.3 ENCOUNTER FOR WEIGHT LOSS COUNSELING: ICD-10-CM

## 2024-09-23 DIAGNOSIS — Z71.82 EXERCISE COUNSELING: ICD-10-CM

## 2024-09-23 DIAGNOSIS — Z13.0 SCREENING, ANEMIA, DEFICIENCY, IRON: ICD-10-CM

## 2024-09-23 DIAGNOSIS — R94.120 ABNORMAL AUDITORY FUNCTION STUDY: ICD-10-CM

## 2024-09-23 DIAGNOSIS — M25.562 CHRONIC PAIN OF LEFT KNEE: ICD-10-CM

## 2024-09-23 DIAGNOSIS — R11.0 NAUSEA: ICD-10-CM

## 2024-09-23 DIAGNOSIS — Z98.84 HISTORY OF BARIATRIC SURGERY: Primary | ICD-10-CM

## 2024-09-23 DIAGNOSIS — Z98.84 S/P GASTRIC BYPASS: ICD-10-CM

## 2024-09-23 DIAGNOSIS — K21.9 GASTROESOPHAGEAL REFLUX DISEASE, UNSPECIFIED WHETHER ESOPHAGITIS PRESENT: ICD-10-CM

## 2024-09-23 DIAGNOSIS — E66.9 OBESITY, UNSPECIFIED CLASSIFICATION, UNSPECIFIED OBESITY TYPE, UNSPECIFIED WHETHER SERIOUS COMORBIDITY PRESENT: ICD-10-CM

## 2024-09-23 DIAGNOSIS — Z13.21 ENCOUNTER FOR VITAMIN DEFICIENCY SCREENING: Primary | ICD-10-CM

## 2024-09-23 DIAGNOSIS — G89.29 CHRONIC PAIN OF LEFT KNEE: ICD-10-CM

## 2024-09-23 PROCEDURE — 99214 OFFICE O/P EST MOD 30 MIN: CPT | Mod: ,,, | Performed by: NURSE PRACTITIONER

## 2024-09-23 NOTE — PROGRESS NOTES
"POST OP BARIATRIC NUTRITION FOLLOW UP    Type of surgery: laparoscopic Gisela-en-Y  Post-op visit:   [] 2 weeks  [] 4 weeks:  [] 2 months:  [] 4 months:  [x] 6 months:  [] 9 months:  [] 1 year:   [] Other:     Height:   Ht Readings from Last 1 Encounters:   09/23/24 5' 7" (1.702 m)      Weight:   Wt Readings from Last 3 Encounters:   09/23/24 1345 84.5 kg (186 lb 4.8 oz)   09/23/24 1315 83.5 kg (184 lb)   06/17/24 1451 93.9 kg (207 lb)      BMI:   BMI Readings from Last 1 Encounters:   09/23/24 29.18 kg/m²        Post op complications:   [] Yes [x] No  If yes: [] Nausea   [] Vomiting   [] Constipation    [] Diarrhea    [] Other:     Dietary tolerance:  [x] Yes [] No [] Comment:     Adequate fluid intake:  [x] Yes [] No Approx. daily fluid intake: 80 oz  Adequate protein intake:  [x] Yes [] No  Approx. daily protein intake:  g    Vitamins/Minerals:   [x] MVI:    [] Biotin:  [] Calcium:    [] Hair/Nails:  [] B 50 complex:   [x] Bariatric Specific MVI:  [] B12:    [] Bariatric Specific Calcium:  [] Iron:    [] Other:   [] Non-compliance:      Labs:   normal  Comment:    Expected compliance:  [x]Good  []Fair  []Poor    Progress:   [x]Patient progressing well at this time with no complaints   [] Patient expressed complaint at this time: See additional notes     Bariatric Diet Education:   Verbalizes understanding Demonstrates  Needs further teaching Needs practice/ supervision Comments    Bariatric Clear [] [] [] []    Bariatric Full [] [] [] []    Bariatric Puree [] [] [] []    Bariatric Soft [] [] [] []    Bariatric Regular [] [] [] []    Bariatric Reintroduction of Starchy CHO [x] [] [] []    Bariatric: Mindful Eating [x] [] [] []    Importance of Protein and Vitamin Protocol [x] [] [] []    Other:          Additional Notes:   Pt is no longer having vomiting issue- resolved from a month prior. She is intaking rec'd amount of protein and water daily and is taking her vitamins as rec'd. Educated pt on bariatric " lifelong diet and limiting starches to one serving daily. Pt understood and said she would call/email with any questions in the future. Said she was starting to have cravings again but feels as though she knows how to control them and not give in.

## 2024-09-23 NOTE — PROGRESS NOTES
A 6 month F/U was conducted with Teagan. Current weight is 186.3 lbs. A body composition was conducted and patient was lost 22 lbs. Since June and 53 lbs. since March. She has been educated on her results. She is walking q.d. by getting 4,000 steps a day and doing pool therapy every once and a while. No issues or concerns at this time.      It is my professional opinion that patient is in the action phase of behavior change.      PRABHA Marino, CPT, CHC

## 2024-09-23 NOTE — PROGRESS NOTES
"Progress Note  Teagan Aldrich  Chief Complaint   Patient presents with    Follow-up     RNY GBP (Revision) 04/22/24- 6 month fu       History of Present Illness:  Ms. Teagan Aldrich is a 58 y.o. female who is 6 mths s/p Lap GJ bypass, takedown previous Nissen fundoplication on 4/22/24 by Dr. Malcom Hathaway.   Pathology benign.      In the initial post op period, pt did experience several large bloody Bms, required 4 units PRBCs blood transfusion, responded well to blood transfusion, stabilized without re-operation. Discharged to home on POD# 3.  DC H/H 10.3/31.      Presents today for routine 6 mth follow up appt.   No complaints. No dysphagia, odynophagia, GERD, NV, or abd pain. Regular BMs.   Pt reports that the protonix has helped significantly with the nausea symptoms. Pt is taking pecpid in am and protonix in PM. Pt has also stopped eating left over meat since it is usually drier and more difficult to swallow.      Labs (9/18/24): WNLs    Diet: compliant with bariatric meal plan  Exercise: regular exercise, walking and physical therapy but cannot do much more due to L ankle and knee pain   Vitamins: compliant with bariatric supplementation per protocol.     Height: 67 in.   Weights:   Trend Weights BMI   Pre-Op 231 # 36.18   2 Week 207#  32   6 Month 184#   28    1 Year       2 Year          Estimated body mass index is 33.67 kg/m² as calculated from the following:    Height as of an earlier encounter on 5/7/24: 5' 7" (1.702 m).    Weight as of an earlier encounter on 5/7/24: 97.5 kg (215 lb).                Pertinent History:  HTN - [x] Yes   [] No    [] Resolved  HLD - [x] Yes   [] No    [] Resolved  DM  -  [x] Yes   [] No    [] Resolved  CHRIS - [x] Yes   [] No   [] Resolved  GERD - [x] Yes   [] No [] Resolved, controlled on PPI   Anticoagulation- [x] Yes   [] No      If yes, type: [x] ASA [] Plavix [] Other, xarelto    Patient Care Team:  Teagan Galdamez MD as PCP - General (Family " Medicine)  Malcom Hathaway MD as Surgeon (Bariatrics)     Subjective:     12 point review of systems conducted, negative except as stated in the history of present illness. See HPI for details.    Review of patient's allergies indicates:   Allergen Reactions    Bactrim [sulfamethoxazole-trimethoprim]      Past Medical History:   Diagnosis Date    Abdominal pain     Allergy Bacterium    Anal polyp     Anxiety     Coronary artery disease     Depression     Diabetes mellitus     Disorder of lumbar spine     Diverticul disease small and large intestine, no perforati or abscess     GERD (gastroesophageal reflux disease)     Heart murmur     Heartburn     Hyperlipidemia     Hypertension     Hypothyroidism     Irregular heart rhythm     Migraine     Morbid obesity     Nausea     Obesity     Panic attack     Sleep apnea     non use of equipment    SVT (supraventricular tachycardia)     Thyroid disease      Past Surgical History:   Procedure Laterality Date    BACK SURGERY      06/2022    CARPAL TUNNEL RELEASE Bilateral     CHOLECYSTECTOMY      COLONOSCOPY      ESOPHAGOGASTRODUODENOSCOPY      hemorroidectomy      JOINT REPLACEMENT      KNEE SURGERY      06/25/2023    LAMINECTOMY      LAPAROSCOPIC CREATION OF BYPASS FROM DUODENUM TO JEJUNUM N/A 04/22/2024    Procedure: CREATION, BYPASS, DUODENUM TO JEJUNUM, LAPAROSCOPIC;  Surgeon: Malcom Hathaway MD;  Location: [facility];  Service: General;  Laterality: N/A;  Lap RNY GBP.Dr. Danny Bernstein to assist    LEFT HEART CATHETERIZATION      NECK SURGERY      12/2019    NISSEN FUNDOPLICATION  2007    ROTATOR CUFF REPAIR Right     SPINE SURGERY      TONSILLECTOMY      TUBAL LIGATION       Family History   Problem Relation Name Age of Onset    Diabetes Mother Teagan Tam     Depression Mother Teagan Tam     Hypertension Mother Teagan Tam     Hyperlipidemia Mother Teagan Tam     Cancer Mother Teagan Tam     Hypertension Father Raphael Kuhny     Hyperlipidemia Father  "Ed [place]     Heart disease Father Raphael Bartholomew     Diabetes Father Raphael Bartholomew     Depression Father Raphael Bartholomew      Social History     Tobacco Use    Smoking status: Former     Types: Cigarettes    Smokeless tobacco: Former   Substance Use Topics    Alcohol use: Not Currently    Drug use: Never      Current Outpatient Medications   Medication Instructions    ALPRAZolam (XANAX) 0.5 mg, Oral, 2 times daily PRN    aspirin (ECOTRIN) 81 mg, Oral, Daily, Hold ASA 4 weeks post op after surgery.    atorvastatin (LIPITOR) 20 mg, Oral, Daily    biotin 5,000 mcg TbDL Oral, Hold postop     carbinoxamine maleate 4 mg, Oral, As needed (PRN)    cholecalciferol, vitamin D3, (VITAMIN D3) 50 mcg (2,000 unit) Cap capsule Oral, Daily    cranberry extract 500 mg Cap Oral, Stop 2 wks pre op and 4 wks postop     cyanocobalamin, vitamin B-12, (VITAMIN B-12 ORAL) Oral    diltiaZEM (CARDIZEM CD) 120 mg, Oral, 3 times daily    estradioL (ESTRACE) 0.5 mg, Oral, Daily, Stop 30 days pre op and post op     isosorbide dinitrate (ISORDIL) 5 mg, Oral, 2 times daily    levothyroxine (SYNTHROID) 50 mcg, Oral, Before breakfast    magnesium 400 mg, Oral, Daily    metFORMIN (GLUCOPHAGE) 500 mg, Oral, 2 times daily, Call prescribing PD about prt op dose before starting pre op diet ask about post op dose     montelukast (SINGULAIR) 10 mg, Oral, Nightly    multivitamin with minerals tablet 1 tablet, Oral, Daily    pantoprazole (PROTONIX) 40 mg, Oral, Daily, Fill full prescription even if you do not have reflux    progesterone (PROMETRIUM) 100 mg, Oral, Daily    promethazine (PHENERGAN) 12.5 mg, Oral, Every 6 hours PRN    sertraline (ZOLOFT) 150 mg, Oral, Nightly    sotaloL (BETAPACE) 80 mg, Oral, 2 times daily    XARELTO 2.5 mg, Oral, Daily, Ok to resume pre op med of Xarelto 1 week after surgery    zinc gluconate 50 mg, Oral, Daily       Objective:     Vital Signs (Most Recent):  Visit Vitals  /72   Pulse 75   Ht 5' 7" (1.702 m)   Wt 83.5 kg (184 lb)   BMI 28.82 " kg/m²       Physical Exam:  General:  Alert and oriented.    Respiratory:  Lungs are clear to auscultation, Respirations are non-labored, Breath sounds are equal.    Cardiovascular:  Normal rate, Regular rhythm, No murmur.    Gastrointestinal:  Soft, Non-tender, Non-distended, Normal bowel sounds    Musculoskeletal:  Normal range of motion, Normal strength.    Integumentary:  Warm, Dry, Pink.    Neurologic:  Alert, Oriented.    Psychiatric:  Cooperative.      Assessment:       ICD-10-CM ICD-9-CM   1. Encounter for vitamin deficiency screening  Z13.21 V77.99   2. Screening, anemia, deficiency, iron  Z13.0 V78.0   3. Electrolyte imbalance risk  Z91.89 V49.89   4. Type 2 diabetes mellitus without complication, unspecified whether long term insulin use  E11.9 250.00   5. S/P gastric bypass  Z98.84 V45.86   6. Encounter for weight loss counseling  Z71.3 V65.3   7. Dietary counseling  Z71.3 V65.3   8. Exercise counseling  Z71.82 V65.41   9. Obesity, unspecified classification, unspecified obesity type, unspecified whether serious comorbidity present  E66.9 278.00   10. Chronic pain of left knee  M25.562 719.46    G89.29 338.29   11. Gastroesophageal reflux disease, unspecified whether esophagitis present  K21.9 530.81   12. Abnormal auditory function study  R94.120 794.15   13. Nausea  R11.0 787.02       Plan:     1. Encounter for vitamin deficiency screening  Vitamin B12      2. Screening, anemia, deficiency, iron  Iron and TIBC    CBC Auto Differential      3. Electrolyte imbalance risk  Comprehensive Metabolic Panel      4. Type 2 diabetes mellitus without complication, unspecified whether long term insulin use  Hemoglobin A1C      5. S/P gastric bypass        6. Encounter for weight loss counseling        7. Dietary counseling        8. Exercise counseling        9. Obesity, unspecified classification, unspecified obesity type, unspecified whether serious comorbidity present        10. Chronic pain of left knee         11. Gastroesophageal reflux disease, unspecified whether esophagitis present        12. Abnormal auditory function study  CBC Auto Differential      13. Nausea              - continue off of drier meat and taking pepcid and ppi daily. Will reevauate at her 1 year visit.      - gave pt Dr. Kee's card just to have for now. Pt understands that we do not suggest plastic surgery for at least 12mths to 18mths after weight loss surgery      - pt asked for a script of phenergan to have on hand to take if she has bouts of nausea. Sent to pharmacy       - continue to exercise  - F/U 6 months with bariatric labs  - Continue diet  - Continue vitamin supplementation  - Support group attendance encouraged  - Keep routine appts with PCP/specialists as scheduled

## 2024-09-25 RX ORDER — PROMETHAZINE HYDROCHLORIDE 25 MG/1
12.5 TABLET ORAL EVERY 6 HOURS PRN
Qty: 20 TABLET | Refills: 0 | Status: SHIPPED | OUTPATIENT
Start: 2024-09-25

## 2025-01-15 ENCOUNTER — CLINICAL SUPPORT (OUTPATIENT)
Dept: BARIATRICS | Facility: HOSPITAL | Age: 60
End: 2025-01-15

## 2025-01-15 DIAGNOSIS — Z98.84 HISTORY OF BARIATRIC SURGERY: Primary | ICD-10-CM

## 2025-01-15 NOTE — PROGRESS NOTES
Pt attended 9m group follow up class. Pt reports no complaints at this time. Pt advised to call with questions/concerns if needed prior to 1 year individual follow up.

## 2025-04-15 ENCOUNTER — PATIENT MESSAGE (OUTPATIENT)
Dept: SURGERY | Facility: CLINIC | Age: 60
End: 2025-04-15
Payer: MEDICARE

## 2025-04-22 ENCOUNTER — RESULTS FOLLOW-UP (OUTPATIENT)
Dept: SURGERY | Facility: CLINIC | Age: 60
End: 2025-04-22

## 2025-04-22 ENCOUNTER — CLINICAL SUPPORT (OUTPATIENT)
Dept: BARIATRICS | Facility: HOSPITAL | Age: 60
End: 2025-04-22

## 2025-04-22 ENCOUNTER — OFFICE VISIT (OUTPATIENT)
Dept: SURGERY | Facility: CLINIC | Age: 60
End: 2025-04-22
Payer: MEDICARE

## 2025-04-22 ENCOUNTER — TELEPHONE (OUTPATIENT)
Dept: SURGERY | Facility: CLINIC | Age: 60
End: 2025-04-22

## 2025-04-22 ENCOUNTER — LAB VISIT (OUTPATIENT)
Dept: LAB | Facility: HOSPITAL | Age: 60
End: 2025-04-22
Attending: SURGERY
Payer: MEDICARE

## 2025-04-22 VITALS
SYSTOLIC BLOOD PRESSURE: 115 MMHG | WEIGHT: 191.81 LBS | WEIGHT: 190 LBS | HEIGHT: 67 IN | BODY MASS INDEX: 29.82 KG/M2 | HEART RATE: 73 BPM | DIASTOLIC BLOOD PRESSURE: 78 MMHG | HEIGHT: 67 IN | BODY MASS INDEX: 30.1 KG/M2

## 2025-04-22 VITALS — BODY MASS INDEX: 29.82 KG/M2 | WEIGHT: 190.38 LBS

## 2025-04-22 DIAGNOSIS — E11.9 TYPE 2 DIABETES MELLITUS WITHOUT COMPLICATION, UNSPECIFIED WHETHER LONG TERM INSULIN USE: ICD-10-CM

## 2025-04-22 DIAGNOSIS — Z71.82 EXERCISE COUNSELING: ICD-10-CM

## 2025-04-22 DIAGNOSIS — Z91.89 ELECTROLYTE IMBALANCE RISK: ICD-10-CM

## 2025-04-22 DIAGNOSIS — Z71.3 ENCOUNTER FOR WEIGHT LOSS COUNSELING: ICD-10-CM

## 2025-04-22 DIAGNOSIS — Z98.84 HISTORY OF BARIATRIC SURGERY: Primary | ICD-10-CM

## 2025-04-22 DIAGNOSIS — Z13.21 ENCOUNTER FOR VITAMIN DEFICIENCY SCREENING: ICD-10-CM

## 2025-04-22 DIAGNOSIS — R94.120 ABNORMAL AUDITORY FUNCTION STUDY: ICD-10-CM

## 2025-04-22 DIAGNOSIS — Z13.0 SCREENING, ANEMIA, DEFICIENCY, IRON: ICD-10-CM

## 2025-04-22 DIAGNOSIS — Z71.3 DIETARY COUNSELING: ICD-10-CM

## 2025-04-22 DIAGNOSIS — E66.3 OVERWEIGHT (BMI 25.0-29.9): ICD-10-CM

## 2025-04-22 DIAGNOSIS — E66.9 OBESITY: Primary | ICD-10-CM

## 2025-04-22 DIAGNOSIS — Z98.890 HISTORY OF NISSEN FUNDOPLICATION: ICD-10-CM

## 2025-04-22 DIAGNOSIS — Z98.84 S/P GASTRIC BYPASS: Primary | ICD-10-CM

## 2025-04-22 LAB
ALBUMIN SERPL-MCNC: 4 G/DL (ref 3.5–5)
ALBUMIN/GLOB SERPL: 1.2 RATIO (ref 1.1–2)
ALP SERPL-CCNC: 112 UNIT/L (ref 40–150)
ALT SERPL-CCNC: 37 UNIT/L (ref 0–55)
ANION GAP SERPL CALC-SCNC: 5 MEQ/L
AST SERPL-CCNC: 27 UNIT/L (ref 11–45)
BASOPHILS # BLD AUTO: 0.04 X10(3)/MCL
BASOPHILS NFR BLD AUTO: 0.8 %
BILIRUB SERPL-MCNC: 0.5 MG/DL
BUN SERPL-MCNC: 13.5 MG/DL (ref 9.8–20.1)
CALCIUM SERPL-MCNC: 9.7 MG/DL (ref 8.4–10.2)
CHLORIDE SERPL-SCNC: 103 MMOL/L (ref 98–107)
CO2 SERPL-SCNC: 31 MMOL/L (ref 22–29)
CREAT SERPL-MCNC: 0.76 MG/DL (ref 0.55–1.02)
CREAT/UREA NIT SERPL: 18
EOSINOPHIL # BLD AUTO: 0.2 X10(3)/MCL (ref 0–0.9)
EOSINOPHIL NFR BLD AUTO: 4 %
ERYTHROCYTE [DISTWIDTH] IN BLOOD BY AUTOMATED COUNT: 12.5 % (ref 11.5–17)
EST. AVERAGE GLUCOSE BLD GHB EST-MCNC: 142.7 MG/DL
GFR SERPLBLD CREATININE-BSD FMLA CKD-EPI: >60 ML/MIN/1.73/M2
GLOBULIN SER-MCNC: 3.4 GM/DL (ref 2.4–3.5)
GLUCOSE SERPL-MCNC: 124 MG/DL (ref 74–100)
HBA1C MFR BLD: 6.6 %
HCT VFR BLD AUTO: 42.5 % (ref 37–47)
HGB BLD-MCNC: 13.6 G/DL (ref 12–16)
IMM GRANULOCYTES # BLD AUTO: 0.01 X10(3)/MCL (ref 0–0.04)
IMM GRANULOCYTES NFR BLD AUTO: 0.2 %
IRON SATN MFR SERPL: 36 % (ref 20–50)
IRON SERPL-MCNC: 121 UG/DL (ref 50–170)
LYMPHOCYTES # BLD AUTO: 1.42 X10(3)/MCL (ref 0.6–4.6)
LYMPHOCYTES NFR BLD AUTO: 28.5 %
MCH RBC QN AUTO: 30.2 PG (ref 27–31)
MCHC RBC AUTO-ENTMCNC: 32 G/DL (ref 33–36)
MCV RBC AUTO: 94.4 FL (ref 80–94)
MONOCYTES # BLD AUTO: 0.27 X10(3)/MCL (ref 0.1–1.3)
MONOCYTES NFR BLD AUTO: 5.4 %
NEUTROPHILS # BLD AUTO: 3.05 X10(3)/MCL (ref 2.1–9.2)
NEUTROPHILS NFR BLD AUTO: 61.1 %
NRBC BLD AUTO-RTO: 0 %
PLATELET # BLD AUTO: 263 X10(3)/MCL (ref 130–400)
PMV BLD AUTO: 9.1 FL (ref 7.4–10.4)
POTASSIUM SERPL-SCNC: 4.6 MMOL/L (ref 3.5–5.1)
PROT SERPL-MCNC: 7.4 GM/DL (ref 6.4–8.3)
RBC # BLD AUTO: 4.5 X10(6)/MCL (ref 4.2–5.4)
SODIUM SERPL-SCNC: 139 MMOL/L (ref 136–145)
TIBC SERPL-MCNC: 213 UG/DL (ref 70–310)
TIBC SERPL-MCNC: 334 UG/DL (ref 250–450)
TRANSFERRIN SERPL-MCNC: 302 MG/DL (ref 180–382)
VIT B12 SERPL-MCNC: 534 PG/ML (ref 213–816)
WBC # BLD AUTO: 4.99 X10(3)/MCL (ref 4.5–11.5)

## 2025-04-22 PROCEDURE — 83540 ASSAY OF IRON: CPT

## 2025-04-22 PROCEDURE — 83036 HEMOGLOBIN GLYCOSYLATED A1C: CPT

## 2025-04-22 PROCEDURE — 3008F BODY MASS INDEX DOCD: CPT | Mod: CPTII,,, | Performed by: NURSE PRACTITIONER

## 2025-04-22 PROCEDURE — 3074F SYST BP LT 130 MM HG: CPT | Mod: CPTII,,, | Performed by: NURSE PRACTITIONER

## 2025-04-22 PROCEDURE — 99214 OFFICE O/P EST MOD 30 MIN: CPT | Mod: ,,, | Performed by: NURSE PRACTITIONER

## 2025-04-22 PROCEDURE — 82607 VITAMIN B-12: CPT

## 2025-04-22 PROCEDURE — 80053 COMPREHEN METABOLIC PANEL: CPT

## 2025-04-22 PROCEDURE — 85025 COMPLETE CBC W/AUTO DIFF WBC: CPT

## 2025-04-22 PROCEDURE — 36415 COLL VENOUS BLD VENIPUNCTURE: CPT

## 2025-04-22 PROCEDURE — 3078F DIAST BP <80 MM HG: CPT | Mod: CPTII,,, | Performed by: NURSE PRACTITIONER

## 2025-04-22 RX ORDER — BUPROPION HYDROCHLORIDE 100 MG/1
100 TABLET ORAL 2 TIMES DAILY
COMMUNITY

## 2025-04-22 RX ORDER — LEVOCETIRIZINE DIHYDROCHLORIDE 5 MG/1
5 TABLET, FILM COATED ORAL NIGHTLY
COMMUNITY

## 2025-04-22 NOTE — PROGRESS NOTES
"Progress Note  Teagan Aldrich  Chief Complaint   Patient presents with    Follow-up     RNY GBP (Revision) 04/22/24- 1 year fu       History of Present Illness:  Ms. Teagan Aldrich is a 58 y.o. female who is 1 year s/p Lap GJ bypass, takedown previous Nissen fundoplication on 4/22/24 by Dr. Malcom Hathaway.   Pathology benign.      In the initial post op period, pt did experience several large bloody Bms, required 4 units PRBCs blood transfusion, responded well to blood transfusion, stabilized without re-operation. Discharged to home on POD# 3.  DC H/H 10.3/31.      Presents today for routine 1 year follow up appt.   No complaints. No dysphagia, odynophagia, vomiting, or abd pain. Regular BMs.     Reports some gerd but mostly due to eating crackers and introducing carbs again. Tried to get off Pepcid in the am but just recently restarted taking this again.     Labs (): getting done today.   Labs (9/18/24): WNLs     Diet: compliant with bariatric meal plan  Exercise: regular exercise, walking and physical therapy but cannot do much more due to L ankle and knee pain   Vitamins: compliant with bariatric supplementation per protocol.     Height: 67 in.   Weights:   Trend Weights BMI   Pre-Op 231 # 36.18   2 Week 207#  32   6 Month 184#   28    1 Year  190# 29   2 Year          Estimated body mass index is 33.67 kg/m² as calculated from the following:    Height as of an earlier encounter on 5/7/24: 5' 7" (1.702 m).    Weight as of an earlier encounter on 5/7/24: 97.5 kg (215 lb).                Pertinent History:  HTN - [x] Yes   [] No    [] Resolved  HLD - [x] Yes   [] No    [] Resolved  DM  -  [] Yes   [] No    [x] Resolved  CHRIS - [] Yes   [] No   [x] Resolved  GERD - [x] Yes   [] No [] Resolved, controlled on PPI   Anticoagulation- [x] Yes   [] No      If yes, type: [x] ASA [] Plavix [x] Other, xarelto    Patient Care Team:  Teagan Galdamez MD as PCP - General (Family Medicine)  Malcom Hathaway MD " as Surgeon (Bariatrics)     Subjective:     12 point review of systems conducted, negative except as stated in the history of present illness. See HPI for details.    Review of patient's allergies indicates:   Allergen Reactions    Bactrim [sulfamethoxazole-trimethoprim]      Past Medical History:   Diagnosis Date    Abdominal pain     Allergy Bacterium    Anal polyp     Anxiety     Coronary artery disease     Depression     Diabetes mellitus     Disorder of lumbar spine     Diverticul disease small and large intestine, no perforati or abscess     GERD (gastroesophageal reflux disease)     Heart murmur     Heartburn     Hyperlipidemia     Hypertension     Hypothyroidism     Irregular heart rhythm     Migraine     Morbid obesity     Nausea     Obesity     Panic attack     Sleep apnea     non use of equipment    SVT (supraventricular tachycardia)     Thyroid disease      Past Surgical History:   Procedure Laterality Date    BACK SURGERY      06/2022    CARPAL TUNNEL RELEASE Bilateral     CHOLECYSTECTOMY      COLONOSCOPY      ESOPHAGOGASTRODUODENOSCOPY      hemorroidectomy      JOINT REPLACEMENT      KNEE SURGERY      06/25/2023    LAMINECTOMY      LAPAROSCOPIC CREATION OF BYPASS FROM DUODENUM TO JEJUNUM N/A 04/22/2024    Procedure: CREATION, BYPASS, DUODENUM TO JEJUNUM, LAPAROSCOPIC;  Surgeon: Malcom Hathaway MD;  Location: [facility];  Service: General;  Laterality: N/A;  Lap RNY GBP.Dr. Danny Bernstein to assist    LEFT HEART CATHETERIZATION      NECK SURGERY      12/2019    NISSEN FUNDOPLICATION  2007    ROTATOR CUFF REPAIR Right     SPINE SURGERY      TONSILLECTOMY      TUBAL LIGATION       Family History   Problem Relation Name Age of Onset    Diabetes Mother Teagan Tam     Depression Mother Teagan Tam     Hypertension Mother Teagan Tam     Hyperlipidemia Mother Teagan Tam     Cancer Mother Teagan Tam     Hypertension Father Ed [place]     Hyperlipidemia Father [place]     Heart disease Father  "Ed [place]     Diabetes Father Raphael Bartholomew     Depression Father Raphael Bartholomew      Social History[1]   Current Outpatient Medications   Medication Instructions    ALPRAZolam (XANAX) 0.5 mg, 2 times daily PRN    aspirin (ECOTRIN) 81 mg, Oral, Daily, Hold ASA 4 weeks post op after surgery.    atorvastatin (LIPITOR) 20 mg, Daily    biotin 5,000 mcg TbDL Take by mouth. Hold postop    buPROPion (WELLBUTRIN) 100 mg, 2 times daily    cranberry extract 500 mg Cap Take by mouth. Stop 2 wks pre op and 4 wks postop    estradioL (ESTRACE) 0.5 mg, Daily    isosorbide dinitrate (ISORDIL) 5 mg, 2 times daily    levocetirizine (XYZAL) 5 mg, Nightly    levothyroxine (SYNTHROID) 50 mcg, Before breakfast    magnesium 400 mg, Daily    montelukast (SINGULAIR) 10 mg, Nightly    multivitamin with minerals tablet 1 tablet, Daily    pantoprazole (PROTONIX) 40 mg, Oral, Daily, Fill full prescription even if you do not have reflux    progesterone (PROMETRIUM) 100 mg, Daily    promethazine (PHENERGAN) 12.5 mg, Oral, Every 6 hours PRN    sertraline (ZOLOFT) 100 mg, Nightly    sotaloL (BETAPACE) 80 mg, 2 times daily    XARELTO 2.5 mg, Oral, Daily, Ok to resume pre op med of Xarelto 1 week after surgery       Objective:     Vital Signs (Most Recent):  Visit Vitals  /78   Pulse 73   Ht 5' 7" (1.702 m)   Wt 86.2 kg (190 lb)   BMI 29.76 kg/m²       Physical Exam:  General:  Alert and oriented.    Respiratory:  Lungs are clear to auscultation, Respirations are non-labored, Breath sounds are equal.    Cardiovascular:  Normal rate, Regular rhythm, No murmur.    Gastrointestinal:  Soft, Non-tender, Non-distended, Normal bowel sounds        Musculoskeletal:  Normal range of motion, Normal strength.    Integumentary:  Warm, Dry, Pink.    Neurologic:  Alert, Oriented.    Psychiatric:  Cooperative.      Assessment:       ICD-10-CM ICD-9-CM   1. S/P gastric bypass  Z98.84 V45.86   2. Dietary counseling  Z71.3 V65.3   3. Exercise counseling  Z71.82 V65.41   4. " Encounter for weight loss counseling  Z71.3 V65.3   5. Overweight (BMI 25.0-29.9)  E66.3 278.02   6. History of Nissen fundoplication  Z98.890 V15.29       Plan:     1. S/P gastric bypass        2. Dietary counseling        3. Exercise counseling        4. Encounter for weight loss counseling        5. Overweight (BMI 25.0-29.9)        6. History of Nissen fundoplication          - GERD  - quit crackers   - 1mth f/u to see if gerd is better since restarting pepcid and ppi nightly .   - Discussed common downfalls of patients that gain weight after their first year such as adding in carbs too soon, popcorn or other snack type foods rather than eating protein forward meals, increasing nut intake, or not measuring amount when eating nuts since this can be very high in calories, as well as falling back into previous bad habits. Pt verbalized understanding of some of the issues her is doing wrong and will try and correct these.   - Discussed possible surgical risks with patient that can occur at any point in time such as but not limited to internal hernia, vitamin and mineral deficiency, dumping syndrome, ulceration from smoking/NSAIDs/Anti-inflammatory medications, gallbladder disfunction, etc. If patient has any severe abd pain that is constant, nausea, vomiting, disruption of bowel movements, or has other concerns they are instructed to call the office or present to the emergency room. Pt verbalized understanding   - F/U 1 year with bariatric labs (annually)  - Continue exercising and following bariatric vitamin supplementation  - Support group attendance encouraged    - Keep routine appts with PCP/specialists as scheduled                    [1]   Social History  Tobacco Use    Smoking status: Former     Types: Cigarettes    Smokeless tobacco: Former   Substance Use Topics    Alcohol use: Not Currently    Drug use: Never

## 2025-04-22 NOTE — TELEPHONE ENCOUNTER
1mth f/u to see if gerd is better since restarting pepcid and ppi nightly and getting off crackers

## 2025-04-22 NOTE — PROGRESS NOTES
"POST OP BARIATRIC NUTRITION FOLLOW UP    Type of surgery: laparoscopic Gisela-en-Y  Post-op visit:   [] 2 weeks  [] 4 weeks:  [] 2 months:  [] 4 months:  [] 6 months:  [] 9 months:  [x] 1 year:   [] Other:     Height:   Ht Readings from Last 1 Encounters:   09/23/24 5' 7" (1.702 m)      Weight:   Wt Readings from Last 3 Encounters:   04/22/25 1256 86.4 kg (190 lb 6.4 oz)   09/23/24 1345 84.5 kg (186 lb 4.8 oz)   09/23/24 1315 83.5 kg (184 lb)      BMI:   BMI Readings from Last 1 Encounters:   04/22/25 29.82 kg/m²       Post op complications:   [] Yes [x] No  If yes: [] Nausea   [] Vomiting   [] Constipation    [] Diarrhea    [] Other:     Dietary tolerance:  [x] Yes [] No [] Comment:     Adequate fluid intake:  [] Yes [x] No Approx. daily fluid intake: 50 oz  Adequate protein intake:  [] Yes [x] No  Approx. daily protein intake: 45 g    Vitamins/Minerals:   [] MVI:    [] Biotin:  [x] Calcium:    [] Hair/Nails:  [] B 50 complex:   [x] Bariatric Specific MVI:  [] B12:    [] Bariatric Specific Calcium:  [] Iron:    [] Other:   [] Non-compliance:      Labs:   no recent labs done- says getting them drawn today  Comment:    Expected compliance:  []Good  [x]Fair  []Poor    Progress:   []Patient progressing well at this time with no complaints   [x] Patient expressed complaint at this time: See additional notes     Bariatric Diet Education:   Verbalizes understanding Demonstrates  Needs further teaching Needs practice/ supervision Comments    Bariatric Clear [] [] [] []    Bariatric Full [] [] [] []    Bariatric Puree [] [] [] []    Bariatric Soft [] [] [] []    Bariatric Regular [] [] [] []    Bariatric Reintroduction of Starchy CHO [x] [] [] []    Bariatric: Mindful Eating [x] [] [] []    Importance of Protein and Vitamin Protocol [x] [] [] []    Other:          Additional Notes:   Pt presents with concern that she has gained weight since her last weight taken in September 2024. She has not been eating enough protein or " drinking enough water daily. She has added back in sugary beverages like Powerade and snacks on CHOs like crackers throughout the day. Told pt to get back to measuring out foods and prioritizing her protein and sugar free beverages. Pt understood. Says she knows what to do but is just struggling with motivation.

## 2025-04-22 NOTE — PROGRESS NOTES
A 1 year follow up visit was conducted with patient.  Current weight is 191.8 lbs. A body composition was conducted and patient was educated on results. Patient has lost 48 lbs (78 lbs. From highest). and 26 inches since preop body composition was conducted.  She admits that she did gain some weight from her lowest weight. She would like to lose more weight. She is walking for exercise when her leg doesn't bother her. She plans to start riding her bike and swimming.  No issues or concerns at this time.    Patient was educated on maintaining weight and behaviors. It is my professional opinion that patient is in the action phase of behavior change.      Nicolle Tijerina, PRABHA, CPT, CHC

## 2025-04-23 NOTE — TELEPHONE ENCOUNTER
Pt asking about sunflower seeds.     Ok to have occasionally but not great to have daily.     Also discussed her A1c with her as well. Discuss with PCP  leslie or ozempic   Statement Selected

## 2025-05-20 ENCOUNTER — TELEPHONE (OUTPATIENT)
Dept: SURGERY | Facility: CLINIC | Age: 60
End: 2025-05-20
Payer: MEDICARE

## 2025-05-20 NOTE — TELEPHONE ENCOUNTER
Spoke to pt . She said her gerd sxs are better since being on meds. She hasn't really ate cracker as much. I let her know she was advised to quit. She said she has been experiencing mild nausea after eating occasionally so she bought a bracelet to help w/ sxs.

## 2025-05-20 NOTE — TELEPHONE ENCOUNTER
----- Message from NICHELLE Webb sent at 4/22/2025  3:14 PM CDT -----  Regarding: FU with pt  1mth f/u to see if gerd is better since restarting pepcid and ppi nightly and getting off crackers

## 2025-06-02 RX ORDER — PANTOPRAZOLE SODIUM 40 MG/1
40 TABLET, DELAYED RELEASE ORAL DAILY
Qty: 90 TABLET | Refills: 3 | Status: SHIPPED | OUTPATIENT
Start: 2025-06-02

## 2025-06-24 ENCOUNTER — TELEPHONE (OUTPATIENT)
Dept: SURGERY | Facility: CLINIC | Age: 60
End: 2025-06-24
Payer: MEDICARE

## 2025-06-24 NOTE — TELEPHONE ENCOUNTER
I called patient to discuss her concerns she stated that she had her colonoscopy  told her that her intestines are twisted from all her weight loss she is also having constipation and she does not feel constipated  but they are calling her in some medication for this issue she is not sure of the name but she wanted to let you know and see if  needs to do anything for this issue

## 2025-06-24 NOTE — TELEPHONE ENCOUNTER
----- Message from Med Assistant Oliva sent at 6/24/2025  4:26 PM CDT -----  Regarding: voicemail  Pt called asking for a call back from Julia if possible- didn't leave any details

## 2025-06-25 NOTE — TELEPHONE ENCOUNTER
"Not at this time, if her intestines were "twisted" she would be having symptoms besides constipation. Just get the records and we will go from there. It is ok to take whatever med he prescribes  "

## 2025-06-25 NOTE — TELEPHONE ENCOUNTER
In better terms for her to understand:     Her colon (large intestine) is unusually long and twisted, with extra loops and turns. Because of this, the doctor could only get partway through during a colonoscopy, they were only able to reach the hepatic flexure, which is the bend in the colon near the liver.    This kind of colon structure can make procedures like colonoscopy more difficult and may sometimes contribute to symptoms like bloating or constipation.    This does NOT mean it is twisted and she needs surgery for it. Just contributes to constipation unfortunately.     F/u with GI, they will be doing a cologuard and if positive referring her to someone else for endoscope

## 2025-08-27 DIAGNOSIS — K21.9 GASTROESOPHAGEAL REFLUX DISEASE: ICD-10-CM

## 2025-08-27 DIAGNOSIS — R11.0 NAUSEA: ICD-10-CM

## 2025-08-27 DIAGNOSIS — R13.10 DYSPHAGIA: Primary | ICD-10-CM

## 2025-08-27 DIAGNOSIS — R10.13 ABDOMINAL PAIN, EPIGASTRIC: ICD-10-CM

## (undated) DEVICE — SUT VLOC 90 3-0 V-20 NDL 6

## (undated) DEVICE — DRAIN SURG HUBLESS 30CM 15FR

## (undated) DEVICE — GLOVE PROTEXIS HYDROGEL SZ7.5

## (undated) DEVICE — SUT ABS CLIP LAPRA-TY CTD

## (undated) DEVICE — GLOVE PROTEXIS LTX MICRO  7.5

## (undated) DEVICE — NDL SAF 21GX1-1/2IN HYPO BVL

## (undated) DEVICE — STAPLER ANVIL DST EEA XL 25MM

## (undated) DEVICE — DRAPE SLUSH WARMER 66X44IN

## (undated) DEVICE — PAD PREP CUFFED NS 24X48IN

## (undated) DEVICE — TROCAR LAPSCP XCEL 12MM 10CM

## (undated) DEVICE — STRIP MEDI WND CLSR 1/2X4IN

## (undated) DEVICE — CUSHION  WC FOAM 20X20X.75IN

## (undated) DEVICE — TRAY CATH FOL SIL URIMTR 16FR

## (undated) DEVICE — STAPLER ECHELON LONG 60X440MM

## (undated) DEVICE — RESERVOIR JACKSON-PRATT 100CC

## (undated) DEVICE — TROCAR ENDOPATH EXCEL DILATING

## (undated) DEVICE — TROCAR ENDOPATH XCEL 5X100MM

## (undated) DEVICE — TROCAR ENDOPATH XCEL 12X100MM

## (undated) DEVICE — SUT COATED VICRYL 3-0 1X27

## (undated) DEVICE — TROCAR ENDOPATH XCEL 11MM 10CM

## (undated) DEVICE — GLOVE PROTEXIS HYDROGEL SZ6

## (undated) DEVICE — IRRIGATOR HYDRO-SURG PLUS 5MM

## (undated) DEVICE — DRAIN JP STD PENROSE 1/4X12IN

## (undated) DEVICE — BAG SPEC RETRV ENDO 4X6IN DISP

## (undated) DEVICE — SUT VICRYL PLUS 4-0 FS-2 27IN

## (undated) DEVICE — GAUZE CURAD IODOFORM .25IN 5YD

## (undated) DEVICE — NDL GRANEE OPEN LOOP GRASPER

## (undated) DEVICE — SOL IRRI STRL WATER 1000ML

## (undated) DEVICE — CLIP ENDO LIGATION LARGE CLIPS

## (undated) DEVICE — MATTRESS HOVERMAT SPLIT TRNSF

## (undated) DEVICE — RELOAD ECHELON FLEX BLU 60MM

## (undated) DEVICE — HOLDER STRIP-T SELF ADH 2X10IN

## (undated) DEVICE — SCISSOR CURVED ENDOPATH 5MM

## (undated) DEVICE — ELECTRODE PATIENT RETURN DISP

## (undated) DEVICE — GLOVE SENSICARE PI SURG 6

## (undated) DEVICE — SHEARS HS LONG 5MM CURVED

## (undated) DEVICE — ANVIL ACCESSORY 25MM DST

## (undated) DEVICE — KIT SURGICAL TURNOVER

## (undated) DEVICE — COVER CAMERA/LASER 9X96IN

## (undated) DEVICE — BINDER ABD 12IN MED/LG 46-62IN

## (undated) DEVICE — SUT SILK 3-0 BLK BR SH 30IN

## (undated) DEVICE — SUT ETHILON 3-0 FS-1 30

## (undated) DEVICE — RELOAD ECHELON FLEX WHT 60MM

## (undated) DEVICE — COVER MAYO STAND REINFRCD 30

## (undated) DEVICE — KIT GEN LAPAROSCOPY LAFAYETTE

## (undated) DEVICE — SYR ONLY LUER LOCK 20CC

## (undated) DEVICE — SUT VICRYL+ 27 UR-6 VIOL